# Patient Record
Sex: MALE | Race: WHITE | NOT HISPANIC OR LATINO | Employment: FULL TIME | URBAN - METROPOLITAN AREA
[De-identification: names, ages, dates, MRNs, and addresses within clinical notes are randomized per-mention and may not be internally consistent; named-entity substitution may affect disease eponyms.]

---

## 2018-02-28 ENCOUNTER — OFFICE VISIT (OUTPATIENT)
Dept: FAMILY MEDICINE | Facility: CLINIC | Age: 38
End: 2018-02-28
Payer: COMMERCIAL

## 2018-02-28 VITALS
BODY MASS INDEX: 25.76 KG/M2 | RESPIRATION RATE: 20 BRPM | DIASTOLIC BLOOD PRESSURE: 66 MMHG | HEIGHT: 69 IN | SYSTOLIC BLOOD PRESSURE: 120 MMHG | HEART RATE: 56 BPM | WEIGHT: 173.94 LBS

## 2018-02-28 DIAGNOSIS — K21.9 GASTROESOPHAGEAL REFLUX DISEASE, ESOPHAGITIS PRESENCE NOT SPECIFIED: Primary | ICD-10-CM

## 2018-02-28 PROCEDURE — 99213 OFFICE O/P EST LOW 20 MIN: CPT | Mod: S$GLB,,, | Performed by: FAMILY MEDICINE

## 2018-02-28 PROCEDURE — 99999 PR PBB SHADOW E&M-EST. PATIENT-LVL III: CPT | Mod: PBBFAC,,, | Performed by: FAMILY MEDICINE

## 2018-02-28 RX ORDER — PANTOPRAZOLE SODIUM 40 MG/1
40 TABLET, DELAYED RELEASE ORAL DAILY
Qty: 30 TABLET | Refills: 5 | Status: SHIPPED | OUTPATIENT
Start: 2018-02-28 | End: 2018-09-25

## 2018-02-28 NOTE — PROGRESS NOTES
Subjective:       Patient ID: Hernando Roman is a 37 y.o. male.    Chief Complaint: Gastroesophageal Reflux    Pt is a 37 y.o. male who presents for evaluation and management of   Encounter Diagnosis   Name Primary?    Gastroesophageal reflux disease, esophagitis presence not specified Yes   .one month   Worse at night, lying down   alcohol makes it worse   Nexium OTC helps a little but still has irritation in back of throat       Doing well on current meds. Denies any side effects. Prevention is up to date.    Review of Systems   HENT: Positive for postnasal drip. Negative for nosebleeds.         Ears feel stopped up      Respiratory: Positive for cough.    Gastrointestinal:        Per HPI       Objective:      Physical Exam   Constitutional: He is oriented to person, place, and time. He appears well-developed and well-nourished.   HENT:   Head: Normocephalic and atraumatic.   Right Ear: External ear normal.   Left Ear: External ear normal.   Nose: Nose normal.   Mouth/Throat: Oropharynx is clear and moist.   Eyes: Conjunctivae and EOM are normal. Pupils are equal, round, and reactive to light. Right eye exhibits no discharge. Left eye exhibits no discharge. No scleral icterus.   Neck: Normal range of motion. Neck supple. No JVD present. No tracheal deviation present. No thyromegaly present.   Cardiovascular: Normal rate, regular rhythm, normal heart sounds and intact distal pulses.    No murmur heard.  Pulmonary/Chest: Effort normal and breath sounds normal. No respiratory distress. He has no wheezes. He has no rales. He exhibits no tenderness.   Abdominal: Soft. Bowel sounds are normal. He exhibits no distension and no mass. There is no tenderness. There is no rebound and no guarding.   Musculoskeletal: Normal range of motion.   Lymphadenopathy:     He has no cervical adenopathy.   Neurological: He is alert and oriented to person, place, and time. He has normal reflexes. He displays normal reflexes. No cranial  nerve deficit. He exhibits normal muscle tone. Coordination normal.   Skin: Skin is warm and dry.   Psychiatric: He has a normal mood and affect. His behavior is normal. Judgment and thought content normal.       Assessment:       1. Gastroesophageal reflux disease, esophagitis presence not specified        Plan:   Hernando was seen today for gastroesophageal reflux.    Diagnoses and all orders for this visit:    Gastroesophageal reflux disease, esophagitis presence not specified  -     pantoprazole (PROTONIX) 40 MG tablet; Take 1 tablet (40 mg total) by mouth once daily.  -     ranitidine (ZANTAC) 150 MG tablet; Take 1 tablet (150 mg total) by mouth nightly.    trying above   RTC 2 weeks   IF still having these weird throat/AR symptoms will treat that, but I think related to GERD   No Follow-up on file.

## 2018-03-14 ENCOUNTER — OFFICE VISIT (OUTPATIENT)
Dept: FAMILY MEDICINE | Facility: CLINIC | Age: 38
End: 2018-03-14
Payer: COMMERCIAL

## 2018-03-14 VITALS
DIASTOLIC BLOOD PRESSURE: 78 MMHG | WEIGHT: 179 LBS | RESPIRATION RATE: 16 BRPM | SYSTOLIC BLOOD PRESSURE: 100 MMHG | HEART RATE: 78 BPM | HEIGHT: 69 IN | BODY MASS INDEX: 26.51 KG/M2

## 2018-03-14 DIAGNOSIS — K21.9 GASTROESOPHAGEAL REFLUX DISEASE, ESOPHAGITIS PRESENCE NOT SPECIFIED: Primary | ICD-10-CM

## 2018-03-14 PROCEDURE — 99999 PR PBB SHADOW E&M-EST. PATIENT-LVL III: CPT | Mod: PBBFAC,,, | Performed by: FAMILY MEDICINE

## 2018-03-14 PROCEDURE — 99213 OFFICE O/P EST LOW 20 MIN: CPT | Mod: S$GLB,,, | Performed by: FAMILY MEDICINE

## 2018-03-14 NOTE — PROGRESS NOTES
Subjective:       Patient ID: Hernando Roman is a 37 y.o. male.    Chief Complaint: Follow-up (reflux)    Pt is a 37 y.o. male who presents for evaluation and management of   Encounter Diagnosis   Name Primary?    Gastroesophageal reflux disease, esophagitis presence not specified Yes     Doing well on current meds. Denies any side effects. Prevention is up to date.    Review of Systems   Respiratory: Positive for cough. Negative for wheezing.         No h/o asthma    Gastrointestinal:        GERD improved but still some residual burning. Wakes him up at night   The cough is what wakes him up         Objective:      Physical Exam   Constitutional: He is oriented to person, place, and time. He appears well-developed and well-nourished.   HENT:   Head: Normocephalic and atraumatic.   Right Ear: External ear normal.   Left Ear: External ear normal.   Nose: Nose normal.   Mouth/Throat: Oropharynx is clear and moist.   Eyes: Conjunctivae and EOM are normal. Pupils are equal, round, and reactive to light. Right eye exhibits no discharge. Left eye exhibits no discharge. No scleral icterus.   Neck: Normal range of motion. Neck supple. No JVD present. No tracheal deviation present. No thyromegaly present.   Cardiovascular: Normal rate, regular rhythm, normal heart sounds and intact distal pulses.    No murmur heard.  Pulmonary/Chest: Effort normal and breath sounds normal. No respiratory distress. He has no wheezes. He has no rales. He exhibits no tenderness.   Abdominal: Soft. Bowel sounds are normal. He exhibits no distension and no mass. There is no tenderness. There is no rebound and no guarding.   Musculoskeletal: Normal range of motion.   Lymphadenopathy:     He has no cervical adenopathy.   Neurological: He is alert and oriented to person, place, and time. He has normal reflexes. He displays normal reflexes. No cranial nerve deficit. He exhibits normal muscle tone. Coordination normal.   Skin: Skin is warm and dry.    Psychiatric: He has a normal mood and affect. His behavior is normal. Judgment and thought content normal.       Assessment:       1. Gastroesophageal reflux disease, esophagitis presence not specified        Plan:   Hernando was seen today for follow-up.    Diagnoses and all orders for this visit:    Gastroesophageal reflux disease, esophagitis presence not specified  -     ranitidine (ZANTAC) 150 MG tablet; Take 1 tablet (150 mg total) by mouth nightly.    continue protonix   Add zantac at night. If cough not improved in two weeks then increase zantac to 300mg   RTC 1 month. Consider EGD if still not improved     No Follow-up on file.

## 2018-09-25 ENCOUNTER — OFFICE VISIT (OUTPATIENT)
Dept: FAMILY MEDICINE | Facility: CLINIC | Age: 38
End: 2018-09-25
Payer: COMMERCIAL

## 2018-09-25 VITALS
DIASTOLIC BLOOD PRESSURE: 80 MMHG | WEIGHT: 170 LBS | HEIGHT: 69 IN | BODY MASS INDEX: 25.18 KG/M2 | SYSTOLIC BLOOD PRESSURE: 100 MMHG | RESPIRATION RATE: 20 BRPM | HEART RATE: 70 BPM

## 2018-09-25 DIAGNOSIS — J02.0 STREP THROAT: Primary | ICD-10-CM

## 2018-09-25 PROCEDURE — 99999 PR PBB SHADOW E&M-EST. PATIENT-LVL III: CPT | Mod: PBBFAC,,, | Performed by: FAMILY MEDICINE

## 2018-09-25 PROCEDURE — 96372 THER/PROPH/DIAG INJ SC/IM: CPT | Mod: S$GLB,,, | Performed by: FAMILY MEDICINE

## 2018-09-25 PROCEDURE — 3008F BODY MASS INDEX DOCD: CPT | Mod: CPTII,S$GLB,, | Performed by: FAMILY MEDICINE

## 2018-09-25 PROCEDURE — 99213 OFFICE O/P EST LOW 20 MIN: CPT | Mod: 25,S$GLB,, | Performed by: FAMILY MEDICINE

## 2018-09-25 RX ORDER — AZITHROMYCIN 250 MG/1
TABLET, FILM COATED ORAL
Qty: 6 TABLET | Refills: 0 | Status: SHIPPED | OUTPATIENT
Start: 2018-09-25 | End: 2019-07-17

## 2018-09-25 RX ORDER — CETIRIZINE HYDROCHLORIDE 10 MG/1
10 TABLET ORAL DAILY
Qty: 30 TABLET | Refills: 5 | Status: SHIPPED | OUTPATIENT
Start: 2018-09-25 | End: 2019-10-17

## 2018-09-25 RX ORDER — METHYLPREDNISOLONE ACETATE 40 MG/ML
60 INJECTION, SUSPENSION INTRA-ARTICULAR; INTRALESIONAL; INTRAMUSCULAR; SOFT TISSUE
Status: COMPLETED | OUTPATIENT
Start: 2018-09-25 | End: 2018-09-25

## 2018-09-25 RX ADMIN — METHYLPREDNISOLONE ACETATE 60 MG: 40 INJECTION, SUSPENSION INTRA-ARTICULAR; INTRALESIONAL; INTRAMUSCULAR; SOFT TISSUE at 01:09

## 2018-09-25 NOTE — PROGRESS NOTES
Chief complaint: Sinus congestion    History of present illness: Pt is 38 y.o. male complaints of sinus congestion, facial pressure, sore throat, ear ache.  Patient states glands are swollen and neck.  Patient has a cough.  This started after choking on corn.  Patient denies chest pain, shortness of breath, fever.  Patient has itchy watery eyes, itchy scratchy throat.  The patient complains of decreased hearing.  Cough is nonproductive    Review of systems:  Constitutional-no weight loss, weight gain  HEENT-allergy symptoms such as itchy watery eyes, post nasal drip, itchy palate, come and go.  Respiratory-no wheezing, see history of present illness  Neurological-no weakness or numbness    Past medical history, family history, social history-same as note dated today    Medications-all reviewed and verified in nurses notes.    Physical exam: Vital signs-reviewed and verified in nurses notes  Gen.-alert, oriented, no apparent distress.  Coughing.  Head-positive facial tenderness over the frontal and maxillary sinuses  Eyes: Pupils equal round reactive to light and accommodation, extraocular muscles intact, conjunctiva clear  Ears: Tympanic membranes are clear and mobile, no fluid present.  Nose: Injected mucous membranes, erythematous, mucopurulent discharge  Throat:  Injected red streaky mucosa,  tonsils normal  Neck: Shotty, tender anterior lymphadenopathy  Heart: Regular rate and rhythm, no murmurs, rubs or gallops  Lungs:Lungs were clear to auscultation and percussion, and with normal diaphragmatic excursion. No wheezes or rales were noted.     Assessment/Plan:   Strep throat  -     methylPREDNISolone acetate injection 60 mg; Inject 1.5 mLs (60 mg total) into the muscle one time.  -     azithromycin (Z-DARON) 250 MG tablet; 2 po day 1, then 1 po q day  Dispense: 6 tablet; Refill: 0  -     cetirizine (ZYRTEC) 10 MG tablet; Take 1 tablet (10 mg total) by mouth once daily.  Dispense: 30 tablet; Refill: 5      Simply  saline nasal lavage q.2 to 3 hours p.r.n.  Avoid dust, allergens, other sinus irritants.  Handwashing technique discussed to prevent spreading germs.

## 2018-10-07 ENCOUNTER — HOSPITAL ENCOUNTER (EMERGENCY)
Facility: HOSPITAL | Age: 38
Discharge: HOME | End: 2018-10-07
Attending: SURGERY
Payer: COMMERCIAL

## 2018-10-07 VITALS
SYSTOLIC BLOOD PRESSURE: 122 MMHG | HEART RATE: 80 BPM | OXYGEN SATURATION: 98 % | RESPIRATION RATE: 16 BRPM | WEIGHT: 173.5 LBS | TEMPERATURE: 98 F | BODY MASS INDEX: 25.62 KG/M2 | DIASTOLIC BLOOD PRESSURE: 72 MMHG

## 2018-10-07 DIAGNOSIS — R06.02 SOB (SHORTNESS OF BREATH): ICD-10-CM

## 2018-10-07 LAB
ALBUMIN SERPL BCP-MCNC: 4.6 G/DL
ALLENS TEST: ABNORMAL
ALP SERPL-CCNC: 76 U/L
ALT SERPL W/O P-5'-P-CCNC: 123 U/L
ANION GAP SERPL CALC-SCNC: 10 MMOL/L
APTT BLDCRRT: 25 SEC
AST SERPL-CCNC: 42 U/L
BASOPHILS # BLD AUTO: 0.03 K/UL
BASOPHILS NFR BLD: 0.3 %
BILIRUB SERPL-MCNC: 0.7 MG/DL
BNP SERPL-MCNC: 11 PG/ML
BUN SERPL-MCNC: 19 MG/DL
CALCIUM SERPL-MCNC: 10.1 MG/DL
CHLORIDE SERPL-SCNC: 103 MMOL/L
CK MB SERPL-MCNC: 1 NG/ML
CK MB SERPL-RTO: 0.7 %
CK SERPL-CCNC: 134 U/L
CK SERPL-CCNC: 134 U/L
CO2 SERPL-SCNC: 28 MMOL/L
CREAT SERPL-MCNC: 1.1 MG/DL
D DIMER PPP IA.FEU-MCNC: 0.46 MG/L FEU
DELSYS: ABNORMAL
DIFFERENTIAL METHOD: ABNORMAL
EOSINOPHIL # BLD AUTO: 0.2 K/UL
EOSINOPHIL NFR BLD: 1.5 %
ERYTHROCYTE [DISTWIDTH] IN BLOOD BY AUTOMATED COUNT: 12.4 %
EST. GFR  (AFRICAN AMERICAN): >60 ML/MIN/1.73 M^2
EST. GFR  (NON AFRICAN AMERICAN): >60 ML/MIN/1.73 M^2
GLUCOSE SERPL-MCNC: 100 MG/DL
HCO3 UR-SCNC: 26.5 MMOL/L (ref 22–26)
HCT VFR BLD AUTO: 48.4 %
HGB BLD-MCNC: 16.6 G/DL
INR PPP: 1
LYMPHOCYTES # BLD AUTO: 2.2 K/UL
LYMPHOCYTES NFR BLD: 22.4 %
MCH RBC QN AUTO: 31.3 PG
MCHC RBC AUTO-ENTMCNC: 34.3 G/DL
MCV RBC AUTO: 91 FL
MONOCYTES # BLD AUTO: 0.8 K/UL
MONOCYTES NFR BLD: 8.4 %
NEUTROPHILS # BLD AUTO: 6.6 K/UL
NEUTROPHILS NFR BLD: 67.4 %
PCO2 BLDA: 34 MMHG (ref 35–45)
PH SMN: 7.5 [PH] (ref 7.35–7.45)
PLATELET # BLD AUTO: 429 K/UL
PMV BLD AUTO: 9.3 FL
PO2 BLDA: 122 MMHG (ref 75–100)
POC BE: 3.7 MMOL/L (ref -2–2)
POC COHB: 0.2 % (ref 0–3)
POC METHB: 0.7 % (ref 0–1.5)
POC O2HB ARTERIAL: 96.4 % (ref 94–100)
POC SATURATED O2: 97.2 % (ref 90–100)
POC TCO2: 27.5 MMOL/L
POC THB: 17.2 G/DL (ref 12–18)
POTASSIUM SERPL-SCNC: 4.6 MMOL/L
PROT SERPL-MCNC: 8.2 G/DL
PROTHROMBIN TIME: 10.3 SEC
RBC # BLD AUTO: 5.3 M/UL
SITE: ABNORMAL
SODIUM SERPL-SCNC: 141 MMOL/L
TROPONIN I SERPL DL<=0.01 NG/ML-MCNC: <0.006 NG/ML
WBC # BLD AUTO: 9.82 K/UL

## 2018-10-07 PROCEDURE — 82553 CREATINE MB FRACTION: CPT

## 2018-10-07 PROCEDURE — 25000242 PHARM REV CODE 250 ALT 637 W/ HCPCS: Performed by: SURGERY

## 2018-10-07 PROCEDURE — 94640 AIRWAY INHALATION TREATMENT: CPT

## 2018-10-07 PROCEDURE — 96372 THER/PROPH/DIAG INJ SC/IM: CPT

## 2018-10-07 PROCEDURE — 83880 ASSAY OF NATRIURETIC PEPTIDE: CPT

## 2018-10-07 PROCEDURE — 93010 ELECTROCARDIOGRAM REPORT: CPT | Mod: ,,, | Performed by: INTERNAL MEDICINE

## 2018-10-07 PROCEDURE — 36415 COLL VENOUS BLD VENIPUNCTURE: CPT

## 2018-10-07 PROCEDURE — 85379 FIBRIN DEGRADATION QUANT: CPT

## 2018-10-07 PROCEDURE — 82803 BLOOD GASES ANY COMBINATION: CPT | Performed by: SURGERY

## 2018-10-07 PROCEDURE — 63600175 PHARM REV CODE 636 W HCPCS: Performed by: SURGERY

## 2018-10-07 PROCEDURE — 80053 COMPREHEN METABOLIC PANEL: CPT

## 2018-10-07 PROCEDURE — 84484 ASSAY OF TROPONIN QUANT: CPT

## 2018-10-07 PROCEDURE — 85610 PROTHROMBIN TIME: CPT

## 2018-10-07 PROCEDURE — 36600 WITHDRAWAL OF ARTERIAL BLOOD: CPT

## 2018-10-07 PROCEDURE — 93005 ELECTROCARDIOGRAM TRACING: CPT

## 2018-10-07 PROCEDURE — 85730 THROMBOPLASTIN TIME PARTIAL: CPT

## 2018-10-07 PROCEDURE — 85025 COMPLETE CBC W/AUTO DIFF WBC: CPT

## 2018-10-07 PROCEDURE — 82550 ASSAY OF CK (CPK): CPT

## 2018-10-07 PROCEDURE — 99284 EMERGENCY DEPT VISIT MOD MDM: CPT | Mod: 25

## 2018-10-07 RX ORDER — METHYLPREDNISOLONE SOD SUCC 125 MG
125 VIAL (EA) INJECTION
Status: COMPLETED | OUTPATIENT
Start: 2018-10-07 | End: 2018-10-07

## 2018-10-07 RX ORDER — METHYLPREDNISOLONE 4 MG/1
TABLET ORAL
Qty: 1 PACKAGE | Refills: 0 | Status: SHIPPED | OUTPATIENT
Start: 2018-10-07 | End: 2019-07-17

## 2018-10-07 RX ORDER — IPRATROPIUM BROMIDE AND ALBUTEROL SULFATE 2.5; .5 MG/3ML; MG/3ML
3 SOLUTION RESPIRATORY (INHALATION)
Status: COMPLETED | OUTPATIENT
Start: 2018-10-07 | End: 2018-10-07

## 2018-10-07 RX ORDER — ALBUTEROL SULFATE 90 UG/1
1-2 AEROSOL, METERED RESPIRATORY (INHALATION) EVERY 6 HOURS PRN
Qty: 1 INHALER | Refills: 0 | Status: SHIPPED | OUTPATIENT
Start: 2018-10-07 | End: 2019-10-17

## 2018-10-07 RX ADMIN — METHYLPREDNISOLONE SODIUM SUCCINATE 125 MG: 125 INJECTION, POWDER, FOR SOLUTION INTRAMUSCULAR; INTRAVENOUS at 05:10

## 2018-10-07 RX ADMIN — IPRATROPIUM BROMIDE AND ALBUTEROL SULFATE 3 ML: .5; 3 SOLUTION RESPIRATORY (INHALATION) at 05:10

## 2018-10-07 NOTE — ED TRIAGE NOTES
"Pt reports thinking he has carbon monoxide poisoning. States "air leak in car" for unknown amount of time. States trouble taking deep breath and coughing x's 3 weeks. Seen by PCP twice.  "

## 2018-10-08 ENCOUNTER — PES CALL (OUTPATIENT)
Dept: ADMINISTRATIVE | Facility: CLINIC | Age: 38
End: 2018-10-08

## 2018-10-08 NOTE — ED PROVIDER NOTES
[facility]                                                 Chief Complaint  38 y.o. male with Shortness of Breath    History of Present Illness  Hernando Roman presents to the emergency room with shortness of breath today  Patient states he recently had automobile work done, old tubing was kept after  Breathed in fumes in the car and thinks he has carbon monoxide exposure PTA  Has clear lungs and normal cardiac evaluation today in the ER, 98% oxygen    The history is provided by the patient   device was not used during this ER visit  Medical history:  Allergies, facet arthropathy, food poisoning  Surgeries:  Appendectomy, cervical nerve block, EGD, wisdom tooth extracted  No Known Allergies     Review of Systems and Physical Exam      Review of Systems  -- Constitution - no fever, denies fatigue, no weakness, no chills  -- Eyes - no tearing or redness, no visual disturbance  -- Ear, Nose - no tinnitus or earache, no nasal congestion or discharge  -- Mouth,Throat - no sore throat, no toothache, normal voice, normal swallowing  -- Respiratory - shortness of breath, no CONNOR no cough or congestion  -- Cardiovascular - denies chest pain, no palpitations, denies claudication  -- Gastrointestinal - denies abdominal pain, nausea, vomiting, or diarrhea  -- Genitourinary - no dysuria, no hematuria, no flank pain, no bladder pain  -- Musculoskeletal - denies back pain, negative for myalgias and arthralgias   -- Neurological - no headache, denies weakness or seizure; no LOC  -- Skin - denies pallor, rash, or changes in skin. no hives or welts noted    Vital Signs  His oral temperature is 97.7 °F (36.5 °C).   His blood pressure is 122/72 and his pulse is 80.   His respiration is 16 and oxygen saturation is 98%.     Physical Exam  -- Nursing note and vitals reviewed  -- Constitutional: Appears well-developed and well-nourished  -- Head: Atraumatic. Normocephalic. No obvious abnormality  --  Eyes: Pupils are equal and reactive to light. Normal conjunctiva and lids  -- Cardiac: Normal rate, regular rhythm and normal heart sounds  -- Pulmonary: Normal respiratory effort, breath sounds clear to auscultation  -- Abdominal: Soft, no tenderness. Normal bowel sounds. Normal liver edge  -- Musculoskeletal: Normal range of motion, no effusions. Joints stable   -- Neurological: No focal deficits. Showed good interaction with staff  -- Skin: Warm and dry. No evidence of rash or cellulitis    Emergency Room Course      Lab Results     K 4.6      CO2 28   BUN 19   CREATININE 1.1      ALKPHOS 76   AST 42 (H)    (H)   BILITOT 0.7   ALBUMIN 4.6   PROT 8.2   WBC 9.82   HGB 16.6   HCT 48.4    (H)         CPKMB 1.0   TROPONINI <0.006   INR 1.0   BNP 11   DDIMER 0.46     Arterial blood gas   PH  7.50*   PCO2  34*   PO2  122*   HCO3  26.50*   POCSATURATED  97.2   BE  3.70*      EKG  -- The EKG findings today were without concerning findings from baseline    Radiology  -- Chest x-ray showed no infiltrate and showed no acute pathology    Medications Given  methylPREDNISolone sodium succinate injection 125 mg (125 mg Intramuscular Given 10/7/18 1730)   albuterol-ipratropium 2.5 mg-0.5 mg/3 mL nebulizer solution 3 mL (3 mLs Nebulization Given 10/7/18 1725)     Diagnosis  -- The encounter diagnosis was SOB (shortness of breath).    Disposition and Plan  -- Disposition: home  -- Condition: stable  -- Follow-up: Patient to follow up with Louisa Briseno NP in 1-2 days.  -- I advised the patient that we have found no life threatening condition today  -- At this time, I believe the patient is clinically stable for discharge.   -- The patient acknowledges that close follow up with a MD is required   -- Patient agrees to comply with all instruction and direction given in the ER    This note is dictated on Dragon Natural Speaking word recognition program.  There are word recognition  mistakes that are occasionally missed on review.          Leroy Landrum MD  10/07/18 1927

## 2019-07-17 ENCOUNTER — OFFICE VISIT (OUTPATIENT)
Dept: FAMILY MEDICINE | Facility: CLINIC | Age: 39
End: 2019-07-17
Payer: COMMERCIAL

## 2019-07-17 VITALS
BODY MASS INDEX: 24.56 KG/M2 | RESPIRATION RATE: 16 BRPM | WEIGHT: 165.81 LBS | DIASTOLIC BLOOD PRESSURE: 80 MMHG | SYSTOLIC BLOOD PRESSURE: 122 MMHG | HEIGHT: 69 IN | HEART RATE: 72 BPM

## 2019-07-17 DIAGNOSIS — R10.12 LUQ PAIN: Primary | ICD-10-CM

## 2019-07-17 PROCEDURE — 3008F PR BODY MASS INDEX (BMI) DOCUMENTED: ICD-10-PCS | Mod: CPTII,S$GLB,, | Performed by: FAMILY MEDICINE

## 2019-07-17 PROCEDURE — 99213 PR OFFICE/OUTPT VISIT, EST, LEVL III, 20-29 MIN: ICD-10-PCS | Mod: S$GLB,,, | Performed by: FAMILY MEDICINE

## 2019-07-17 PROCEDURE — 99213 OFFICE O/P EST LOW 20 MIN: CPT | Mod: S$GLB,,, | Performed by: FAMILY MEDICINE

## 2019-07-17 PROCEDURE — 99999 PR PBB SHADOW E&M-EST. PATIENT-LVL III: ICD-10-PCS | Mod: PBBFAC,,, | Performed by: FAMILY MEDICINE

## 2019-07-17 PROCEDURE — 3008F BODY MASS INDEX DOCD: CPT | Mod: CPTII,S$GLB,, | Performed by: FAMILY MEDICINE

## 2019-07-17 PROCEDURE — 99999 PR PBB SHADOW E&M-EST. PATIENT-LVL III: CPT | Mod: PBBFAC,,, | Performed by: FAMILY MEDICINE

## 2019-07-17 RX ORDER — SUCRALFATE 1 G/1
1 TABLET ORAL
Qty: 120 TABLET | Refills: 1 | Status: SHIPPED | OUTPATIENT
Start: 2019-07-17 | End: 2019-09-20 | Stop reason: SDUPTHER

## 2019-07-17 RX ORDER — PANTOPRAZOLE SODIUM 40 MG/1
40 TABLET, DELAYED RELEASE ORAL DAILY
Qty: 30 TABLET | Refills: 5 | Status: SHIPPED | OUTPATIENT
Start: 2019-07-17 | End: 2019-08-16

## 2019-07-17 NOTE — PROGRESS NOTES
Subjective:       Patient ID: Hernando Roman is a 39 y.o. male.    Chief Complaint: Annual Exam and Left abd pain    Pt is a 39 y.o. male who presents for evaluation and management of   Encounter Diagnosis   Name Primary?    LUQ pain Yes   .2 months   Gradually worsening   Doesn't seem to be related to any particular type of food   Worse when working out   Tried Nexium OTC daily not helping     Doing well on current meds. Denies any side effects. Prevention is up to date.    Review of Systems   Constitutional: Positive for appetite change (no appetite lately) and chills. Negative for fever.   Gastrointestinal: Positive for abdominal pain and nausea. Negative for blood in stool, constipation, diarrhea and vomiting.   Endocrine: Positive for cold intolerance.        Objective:      Physical Exam   Constitutional: He is oriented to person, place, and time. He appears well-developed and well-nourished.   HENT:   Head: Normocephalic and atraumatic.   Right Ear: External ear normal.   Left Ear: External ear normal.   Nose: Nose normal.   Mouth/Throat: Oropharynx is clear and moist.   Eyes: Pupils are equal, round, and reactive to light. Conjunctivae and EOM are normal. Right eye exhibits no discharge. Left eye exhibits no discharge. No scleral icterus.   Neck: Normal range of motion. Neck supple. No JVD present. No tracheal deviation present. No thyromegaly present.   Cardiovascular: Normal rate, regular rhythm, normal heart sounds and intact distal pulses.   No murmur heard.  Pulmonary/Chest: Effort normal and breath sounds normal. No respiratory distress. He has no wheezes. He has no rales. He exhibits no tenderness.   Abdominal: Soft. Bowel sounds are normal. He exhibits no distension and no mass. There is tenderness (LUQ TTP ). There is no rebound and no guarding.   Musculoskeletal: Normal range of motion.   Lymphadenopathy:     He has no cervical adenopathy.   Neurological: He is alert and oriented to person, place,  and time. He has normal reflexes. He displays normal reflexes. No cranial nerve deficit. He exhibits normal muscle tone. Coordination normal.   Skin: Skin is warm and dry.   Psychiatric: He has a normal mood and affect. His behavior is normal. Judgment and thought content normal.        Assessment:       1. LUQ pain        Plan:   Hernando was seen today for annual exam and left abd pain.    Diagnoses and all orders for this visit:    LUQ pain  -     pantoprazole (PROTONIX) 40 MG tablet; Take 1 tablet (40 mg total) by mouth once daily.  -     sucralfate (CARAFATE) 1 gram tablet; Take 1 tablet (1 g total) by mouth 4 (four) times daily before meals and nightly.      Problem List Items Addressed This Visit     None      Visit Diagnoses     LUQ pain    -  Primary    Relevant Medications    pantoprazole (PROTONIX) 40 MG tablet    sucralfate (CARAFATE) 1 gram tablet      trial of above   RTC 2 weeks   EGD if not improving     No follow-ups on file.

## 2019-09-20 DIAGNOSIS — R10.12 LUQ PAIN: ICD-10-CM

## 2019-09-22 RX ORDER — SUCRALFATE 1 G/1
1 TABLET ORAL
Qty: 120 TABLET | Refills: 1 | Status: SHIPPED | OUTPATIENT
Start: 2019-09-22 | End: 2019-09-25

## 2019-09-25 ENCOUNTER — TELEPHONE (OUTPATIENT)
Dept: FAMILY MEDICINE | Facility: CLINIC | Age: 39
End: 2019-09-25

## 2019-09-25 ENCOUNTER — HOSPITAL ENCOUNTER (EMERGENCY)
Facility: HOSPITAL | Age: 39
Discharge: HOME | End: 2019-09-25
Attending: SURGERY
Payer: COMMERCIAL

## 2019-09-25 VITALS
SYSTOLIC BLOOD PRESSURE: 109 MMHG | HEART RATE: 64 BPM | OXYGEN SATURATION: 95 % | TEMPERATURE: 96 F | WEIGHT: 167.56 LBS | RESPIRATION RATE: 20 BRPM | DIASTOLIC BLOOD PRESSURE: 73 MMHG | BODY MASS INDEX: 24.74 KG/M2

## 2019-09-25 DIAGNOSIS — R00.2 PALPITATIONS: Primary | ICD-10-CM

## 2019-09-25 DIAGNOSIS — R07.9 CHEST PAIN, UNSPECIFIED TYPE: ICD-10-CM

## 2019-09-25 DIAGNOSIS — R06.02 SOB (SHORTNESS OF BREATH): ICD-10-CM

## 2019-09-25 DIAGNOSIS — R11.0 NAUSEA: ICD-10-CM

## 2019-09-25 LAB
ALBUMIN SERPL BCP-MCNC: 4.9 G/DL (ref 3.5–5.2)
ALP SERPL-CCNC: 79 U/L (ref 55–135)
ALT SERPL W/O P-5'-P-CCNC: 69 U/L (ref 10–44)
AMPHET+METHAMPHET UR QL: NEGATIVE
ANION GAP SERPL CALC-SCNC: 11 MMOL/L (ref 8–16)
AST SERPL-CCNC: 32 U/L (ref 10–40)
BARBITURATES UR QL SCN>200 NG/ML: NEGATIVE
BASOPHILS # BLD AUTO: 0.03 K/UL (ref 0–0.2)
BASOPHILS NFR BLD: 0.5 % (ref 0–1.9)
BENZODIAZ UR QL SCN>200 NG/ML: NEGATIVE
BILIRUB SERPL-MCNC: 1.1 MG/DL (ref 0.1–1)
BILIRUB UR QL STRIP: NEGATIVE
BNP SERPL-MCNC: <10 PG/ML (ref 0–99)
BUN SERPL-MCNC: 17 MG/DL (ref 6–20)
BZE UR QL SCN: NEGATIVE
CALCIUM SERPL-MCNC: 10.2 MG/DL (ref 8.7–10.5)
CANNABINOIDS UR QL SCN: NEGATIVE
CHLORIDE SERPL-SCNC: 101 MMOL/L (ref 95–110)
CK MB SERPL-MCNC: 0.6 NG/ML (ref 0.1–6.5)
CK MB SERPL-MCNC: 0.6 NG/ML (ref 0.1–6.5)
CK MB SERPL-RTO: 0.6 % (ref 0–5)
CK MB SERPL-RTO: 0.7 % (ref 0–5)
CK SERPL-CCNC: 81 U/L (ref 20–200)
CK SERPL-CCNC: 81 U/L (ref 20–200)
CK SERPL-CCNC: 95 U/L (ref 20–200)
CK SERPL-CCNC: 95 U/L (ref 20–200)
CLARITY UR: CLEAR
CO2 SERPL-SCNC: 26 MMOL/L (ref 23–29)
COLOR UR: YELLOW
CREAT SERPL-MCNC: 1.3 MG/DL (ref 0.5–1.4)
CREAT UR-MCNC: 40.6 MG/DL (ref 23–375)
D DIMER PPP IA.FEU-MCNC: <0.19 MG/L FEU
DIFFERENTIAL METHOD: ABNORMAL
EOSINOPHIL # BLD AUTO: 0.1 K/UL (ref 0–0.5)
EOSINOPHIL NFR BLD: 2.1 % (ref 0–8)
ERYTHROCYTE [DISTWIDTH] IN BLOOD BY AUTOMATED COUNT: 11.4 % (ref 11.5–14.5)
EST. GFR  (AFRICAN AMERICAN): >60 ML/MIN/1.73 M^2
EST. GFR  (NON AFRICAN AMERICAN): >60 ML/MIN/1.73 M^2
GLUCOSE SERPL-MCNC: 92 MG/DL (ref 70–110)
GLUCOSE UR QL STRIP: NEGATIVE
HCT VFR BLD AUTO: 48.9 % (ref 40–54)
HGB BLD-MCNC: 16.9 G/DL (ref 14–18)
HGB UR QL STRIP: ABNORMAL
IMM GRANULOCYTES # BLD AUTO: 0.01 K/UL (ref 0–0.04)
IMM GRANULOCYTES NFR BLD AUTO: 0.2 % (ref 0–0.5)
KETONES UR QL STRIP: NEGATIVE
LEUKOCYTE ESTERASE UR QL STRIP: NEGATIVE
LYMPHOCYTES # BLD AUTO: 1.9 K/UL (ref 1–4.8)
LYMPHOCYTES NFR BLD: 34.4 % (ref 18–48)
MCH RBC QN AUTO: 30.9 PG (ref 27–31)
MCHC RBC AUTO-ENTMCNC: 34.6 G/DL (ref 32–36)
MCV RBC AUTO: 89 FL (ref 82–98)
METHADONE UR QL SCN>300 NG/ML: NEGATIVE
MONOCYTES # BLD AUTO: 0.4 K/UL (ref 0.3–1)
MONOCYTES NFR BLD: 7.8 % (ref 4–15)
NEUTROPHILS # BLD AUTO: 3.1 K/UL (ref 1.8–7.7)
NEUTROPHILS NFR BLD: 55 % (ref 38–73)
NITRITE UR QL STRIP: NEGATIVE
NRBC BLD-RTO: 0 /100 WBC
OPIATES UR QL SCN: NEGATIVE
PCP UR QL SCN>25 NG/ML: NEGATIVE
PH UR STRIP: 7 [PH] (ref 5–8)
PLATELET # BLD AUTO: 308 K/UL (ref 150–350)
PMV BLD AUTO: 9.3 FL (ref 9.2–12.9)
POCT GLUCOSE: 80 MG/DL (ref 70–110)
POTASSIUM SERPL-SCNC: 3.9 MMOL/L (ref 3.5–5.1)
PROT SERPL-MCNC: 8.2 G/DL (ref 6–8.4)
PROT UR QL STRIP: NEGATIVE
RBC # BLD AUTO: 5.47 M/UL (ref 4.6–6.2)
SODIUM SERPL-SCNC: 138 MMOL/L (ref 136–145)
SP GR UR STRIP: 1.01 (ref 1–1.03)
TOXICOLOGY INFORMATION: NORMAL
TROPONIN I SERPL DL<=0.01 NG/ML-MCNC: <0.006 NG/ML (ref 0–0.03)
TROPONIN I SERPL DL<=0.01 NG/ML-MCNC: <0.006 NG/ML (ref 0–0.03)
TSH SERPL DL<=0.005 MIU/L-ACNC: 0.76 UIU/ML (ref 0.4–4)
URN SPEC COLLECT METH UR: ABNORMAL
UROBILINOGEN UR STRIP-ACNC: NEGATIVE EU/DL
WBC # BLD AUTO: 5.64 K/UL (ref 3.9–12.7)

## 2019-09-25 PROCEDURE — 84443 ASSAY THYROID STIM HORMONE: CPT

## 2019-09-25 PROCEDURE — 36415 COLL VENOUS BLD VENIPUNCTURE: CPT

## 2019-09-25 PROCEDURE — 96375 TX/PRO/DX INJ NEW DRUG ADDON: CPT

## 2019-09-25 PROCEDURE — 84484 ASSAY OF TROPONIN QUANT: CPT

## 2019-09-25 PROCEDURE — 99285 EMERGENCY DEPT VISIT HI MDM: CPT | Mod: 25

## 2019-09-25 PROCEDURE — 83880 ASSAY OF NATRIURETIC PEPTIDE: CPT

## 2019-09-25 PROCEDURE — 82962 GLUCOSE BLOOD TEST: CPT

## 2019-09-25 PROCEDURE — 81003 URINALYSIS AUTO W/O SCOPE: CPT

## 2019-09-25 PROCEDURE — 80053 COMPREHEN METABOLIC PANEL: CPT

## 2019-09-25 PROCEDURE — 80307 DRUG TEST PRSMV CHEM ANLYZR: CPT

## 2019-09-25 PROCEDURE — 85379 FIBRIN DEGRADATION QUANT: CPT

## 2019-09-25 PROCEDURE — 96374 THER/PROPH/DIAG INJ IV PUSH: CPT

## 2019-09-25 PROCEDURE — 93010 ELECTROCARDIOGRAM REPORT: CPT | Mod: ,,, | Performed by: INTERNAL MEDICINE

## 2019-09-25 PROCEDURE — 93005 ELECTROCARDIOGRAM TRACING: CPT

## 2019-09-25 PROCEDURE — 25000003 PHARM REV CODE 250: Performed by: NURSE PRACTITIONER

## 2019-09-25 PROCEDURE — 82550 ASSAY OF CK (CPK): CPT | Mod: 91

## 2019-09-25 PROCEDURE — 82553 CREATINE MB FRACTION: CPT | Mod: 91

## 2019-09-25 PROCEDURE — 63600175 PHARM REV CODE 636 W HCPCS: Performed by: NURSE PRACTITIONER

## 2019-09-25 PROCEDURE — 93010 EKG 12-LEAD: ICD-10-PCS | Mod: ,,, | Performed by: INTERNAL MEDICINE

## 2019-09-25 PROCEDURE — 96361 HYDRATE IV INFUSION ADD-ON: CPT

## 2019-09-25 PROCEDURE — 85025 COMPLETE CBC W/AUTO DIFF WBC: CPT

## 2019-09-25 RX ORDER — METHYLPREDNISOLONE SOD SUCC 125 MG
125 VIAL (EA) INJECTION
Status: DISCONTINUED | OUTPATIENT
Start: 2019-09-25 | End: 2019-09-25

## 2019-09-25 RX ORDER — SUCRALFATE 1 G/10ML
1 SUSPENSION ORAL
Qty: 414 ML | Refills: 0 | Status: ON HOLD | OUTPATIENT
Start: 2019-09-25 | End: 2019-10-22

## 2019-09-25 RX ORDER — ONDANSETRON 4 MG/1
4 TABLET, ORALLY DISINTEGRATING ORAL EVERY 8 HOURS PRN
Qty: 9 TABLET | Refills: 0 | Status: SHIPPED | OUTPATIENT
Start: 2019-09-25 | End: 2019-09-28

## 2019-09-25 RX ORDER — ASPIRIN 325 MG
325 TABLET ORAL
Status: COMPLETED | OUTPATIENT
Start: 2019-09-25 | End: 2019-09-25

## 2019-09-25 RX ORDER — METHYLPREDNISOLONE SOD SUCC 125 MG
80 VIAL (EA) INJECTION
Status: COMPLETED | OUTPATIENT
Start: 2019-09-25 | End: 2019-09-25

## 2019-09-25 RX ORDER — ONDANSETRON 2 MG/ML
4 INJECTION INTRAMUSCULAR; INTRAVENOUS
Status: COMPLETED | OUTPATIENT
Start: 2019-09-25 | End: 2019-09-25

## 2019-09-25 RX ADMIN — METHYLPREDNISOLONE SODIUM SUCCINATE 80 MG: 125 INJECTION, POWDER, FOR SOLUTION INTRAMUSCULAR; INTRAVENOUS at 02:09

## 2019-09-25 RX ADMIN — ASPIRIN 325 MG ORAL TABLET 325 MG: 325 PILL ORAL at 01:09

## 2019-09-25 RX ADMIN — SODIUM CHLORIDE 1000 ML: 0.9 INJECTION, SOLUTION INTRAVENOUS at 01:09

## 2019-09-25 RX ADMIN — ONDANSETRON 4 MG: 2 INJECTION INTRAMUSCULAR; INTRAVENOUS at 02:09

## 2019-09-25 NOTE — ED PROVIDER NOTES
Encounter Date: 2019       History     Chief Complaint   Patient presents with    Shortness of Breath     The history is provided by the patient.   Palpitations    This is a new problem. Illness onset: 1 week. The problem has been unchanged. Associated with: unknown. Associated symptoms include diaphoresis (earlier today), chest pain (mild, 2/10, worse with deep breathing) and irregular heartbeat. Pertinent negatives include no fever, no malaise/fatigue, no numbness, no syncope, no abdominal pain, no nausea, no vomiting, no headaches, no back pain, no dizziness, no weakness, no cough and no shortness of breath. He has tried nothing for the symptoms. Risk factors include being male.     Review of patient's allergies indicates:  No Known Allergies  Past Medical History:   Diagnosis Date    Allergy     Facet arthropathy, cervical     Food poisoning due to bacteria      Past Surgical History:   Procedure Laterality Date    ESOPHAGOGASTRODUODENOSCOPY      WISDOM TOOTH EXTRACTION Bilateral 2007    x4 teeth     Family History   Problem Relation Age of Onset    Asthma Mother     Hypertension Father     Cancer Father 55        prostate     Social History     Tobacco Use    Smoking status: Former Smoker     Packs/day: 1.00     Years: 18.00     Pack years: 18.00     Start date: 1996     Last attempt to quit: 2013     Years since quittin.1    Smokeless tobacco: Former User     Quit date: 1998    Tobacco comment: Chewed tobacco about 2 or 3 years   Substance Use Topics    Alcohol use: Yes     Comment: Once a month at the most-3 or 4 drinks    Drug use: No     Review of Systems   Constitutional: Positive for diaphoresis (earlier today). Negative for fever and malaise/fatigue.   HENT: Negative for congestion, ear pain, rhinorrhea, sore throat and trouble swallowing.    Eyes: Negative for pain, discharge, redness and visual disturbance.   Respiratory: Negative for cough, shortness of breath  and wheezing.    Cardiovascular: Positive for chest pain (mild, 2/10, worse with deep breathing) and palpitations. Negative for leg swelling and syncope.   Gastrointestinal: Negative for abdominal pain, constipation, diarrhea, nausea and vomiting.   Genitourinary: Negative for difficulty urinating, dysuria, flank pain, frequency and urgency.   Musculoskeletal: Negative for arthralgias, back pain, myalgias and neck pain.   Skin: Negative for rash and wound.   Neurological: Negative for dizziness, seizures, weakness, numbness and headaches.   Psychiatric/Behavioral: Negative.        Physical Exam     Initial Vitals [09/25/19 1218]   BP Pulse Resp Temp SpO2   (!) 134/98 64 18 96.4 °F (35.8 °C) 99 %      MAP       --         Physical Exam    Nursing note and vitals reviewed.  Constitutional: No distress.   HENT:   Head: Normocephalic and atraumatic.   Right Ear: Tympanic membrane, external ear and ear canal normal.   Left Ear: Tympanic membrane, external ear and ear canal normal.   Nose: Nose normal.   Mouth/Throat: Oropharynx is clear and moist.   Eyes: Conjunctivae, EOM and lids are normal. Pupils are equal, round, and reactive to light.   Neck: Neck supple.   Cardiovascular: Normal rate, regular rhythm, normal heart sounds and intact distal pulses.   Pulmonary/Chest: Breath sounds normal. No respiratory distress.   Abdominal: Soft. Bowel sounds are normal. There is no tenderness.   Musculoskeletal: Normal range of motion.   Neurological: He is alert and oriented to person, place, and time. He has normal strength.   Skin: Skin is warm and dry. Capillary refill takes less than 2 seconds. No rash noted.   Psychiatric: His speech is normal and behavior is normal. His mood appears anxious.         ED Course   Procedures  Labs Reviewed   CBC W/ AUTO DIFFERENTIAL - Abnormal; Notable for the following components:       Result Value    RDW 11.4 (*)     All other components within normal limits   COMPREHENSIVE METABOLIC PANEL  - Abnormal; Notable for the following components:    Total Bilirubin 1.1 (*)     ALT 69 (*)     All other components within normal limits   URINALYSIS, REFLEX TO URINE CULTURE - Abnormal; Notable for the following components:    Occult Blood UA Trace (*)     All other components within normal limits    Narrative:     Preferred Collection Type->Urine, Clean Catch   CK-MB   CK   TROPONIN I   B-TYPE NATRIURETIC PEPTIDE   D DIMER, QUANTITATIVE   TSH   DRUG SCREEN PANEL, URINE EMERGENCY    Narrative:     Preferred Collection Type->Urine, Clean Catch   CK-MB   CK   TROPONIN I   POCT GLUCOSE   POCT GLUCOSE MONITORING CONTINUOUS     EKG Readings: (Independently Interpreted)   EKG read with MD at the time it was performed. EKG without concerning findings.      ECG Results          EKG 12-lead (Final result)  Result time 09/25/19 15:51:53    Final result by Interface, Lab In [facility] (09/25/19 15:51:53)                 Narrative:    Test Reason : R06.02    Vent. Rate : 069 BPM     Atrial Rate : 069 BPM     P-R Int : 124 ms          QRS Dur : 078 ms      QT Int : 380 ms       P-R-T Axes : 049 041 040 degrees     QTc Int : 407 ms    Normal sinus rhythm with sinus arrhythmia  Normal ECG  When compared with ECG of 07-OCT-2018 16:12,  No significant change was found  Confirmed by Radhames Armstrong MD ([id number]) on 9/25/2019 3:51:46 PM    Referred By: [email or web address]   SELF           Confirmed By:Radhames Armstrong MD                            Imaging Results          X-Ray Chest PA And Lateral (Final result)  Result time 09/25/19 12:52:26    Final result by Karrie Lewis MD (09/25/19 12:52:26)                 Impression:      No acute abnormality.      Electronically signed by: Karrie Lewis MD  Date:    09/25/2019  Time:    12:52             Narrative:    EXAMINATION:  XR CHEST PA AND LATERAL    CLINICAL HISTORY:  Palpitations    TECHNIQUE:  PA and lateral views of the chest were performed.    COMPARISON:  10/07/2018    FINDINGS:  The lungs  are clear, with normal appearance of pulmonary vasculature and no pleural effusion or pneumothorax.    The cardiac silhouette is normal in size. The hilar and mediastinal contours are unremarkable.    Bones are intact.                                           Medications   aspirin tablet 325 mg (325 mg Oral Given 9/25/19 1305)   sodium chloride 0.9% bolus 1,000 mL (0 mLs Intravenous Stopped 9/25/19 1418)   ondansetron injection 4 mg (4 mg Intravenous Given 9/25/19 1454)   methylPREDNISolone sodium succinate injection 80 mg (80 mg Intravenous Given 9/25/19 1454)                ED Course as of Sep 25 1700   Wed Sep 25, 2019   1445 Patient reports significant improvement in his symptoms.  Reports that he no longer has chest pain. He is concerned because his apple watch is showing a jump in his heart rate from the 50s to 90s intermittently.  Pulse here has been 50s to 70s, normal sinus rhythm.    [EW]   1655 Patient reports continued improvement in his symptoms.  Reports that he is also currently being treated for peptic ulcer disease with Carafate pills.  He is requesting a prescription for the liquid medication.  He was educated to follow up with his primary care doctor and Cardiology for Holter monitoring.  Patient and significant other voiced understanding.  Strict return precautions were discussed.    [EW]   1657 Patient reports that the chest pain noted with deep breathing has completely resolved.    [EW]      ED Course User Index  [EW] Dalila Gilman NP     Clinical Impression:       ICD-10-CM ICD-9-CM   1. Palpitations R00.2 785.1   2. SOB (shortness of breath) R06.02 786.05   3. Nausea R11.0 787.02   4. Chest pain, unspecified type R07.9 786.50     New Prescriptions    ONDANSETRON (ZOFRAN-ODT) 4 MG TBDL    Take 1 tablet (4 mg total) by mouth every 8 (eight) hours as needed (nausea).    SUCRALFATE (CARAFATE) 100 MG/ML SUSPENSION    Take 10 mLs (1 g total) by mouth 4 (four) times daily with meals and nightly.  *1 hour prior to meals       Disposition:   Disposition: Discharged  Condition: Stable    The patient acknowledges that close follow up with medical provider is required. Instructed to follow up with PCP and cardiology within 2 days. Patient was given specific return precautions. The patient agrees to comply with all instruction and directions given in the ER.                       Dalila Gilman NP  09/25/19 2770

## 2019-09-25 NOTE — TELEPHONE ENCOUNTER
Pt calling for an appt today with Dr Quintero--pt with c/o chest pain/pressure, slight trouble breathing. Increased pulse. Advised pt to report to ER for eval due to s/s, voiced understanding.

## 2019-09-25 NOTE — ED TRIAGE NOTES
Arrives per self transport from home. Presents with complaints of SOB/chest discomfort over the past week. Reports that symptoms started 1 month ago. Worked up by Dr Quintero and treated for GI symptoms but heart rate and SOB have persisted. Currently AAOx4. Appears in no apparent distress. Vitals stable. Patient to be roomed in ED  for evaluation.

## 2019-09-25 NOTE — DISCHARGE INSTRUCTIONS
**Follow up with PCP and Cardiology in 24-48 hours.  Discuss need for EGD with PCP.  Discuss need for Holter monitor with Cardiology.  Return to ER with worsening of symptoms.     **Drink plenty fluids. Get plenty rest. Wash hands frequently.  Avoid caffeine.    **Our goal in the emergency department is to always give you outstanding care and exceptional service. You may receive a survey by mail or e-mail in the next week regarding your experience in our ED. We would greatly appreciate your completing and returning the survey. Your feedback provides us with a way to recognize our staff who give very good care and it helps us learn how to improve when your experience was below our aspiration of excellence.

## 2019-09-26 ENCOUNTER — OFFICE VISIT (OUTPATIENT)
Dept: INTERNAL MEDICINE | Facility: CLINIC | Age: 39
End: 2019-09-26
Payer: COMMERCIAL

## 2019-09-26 ENCOUNTER — TELEPHONE (OUTPATIENT)
Dept: FAMILY MEDICINE | Facility: CLINIC | Age: 39
End: 2019-09-26

## 2019-09-26 VITALS
HEART RATE: 76 BPM | BODY MASS INDEX: 24.73 KG/M2 | SYSTOLIC BLOOD PRESSURE: 122 MMHG | OXYGEN SATURATION: 99 % | WEIGHT: 167 LBS | HEIGHT: 69 IN | RESPIRATION RATE: 18 BRPM | DIASTOLIC BLOOD PRESSURE: 86 MMHG

## 2019-09-26 DIAGNOSIS — R10.11 RIGHT UPPER QUADRANT PAIN: Primary | ICD-10-CM

## 2019-09-26 DIAGNOSIS — R11.0 NAUSEA: ICD-10-CM

## 2019-09-26 PROCEDURE — 99214 OFFICE O/P EST MOD 30 MIN: CPT | Mod: S$GLB,,, | Performed by: NURSE PRACTITIONER

## 2019-09-26 PROCEDURE — 99999 PR PBB SHADOW E&M-EST. PATIENT-LVL IV: ICD-10-PCS | Mod: PBBFAC,,, | Performed by: NURSE PRACTITIONER

## 2019-09-26 PROCEDURE — 99999 PR PBB SHADOW E&M-EST. PATIENT-LVL IV: CPT | Mod: PBBFAC,,, | Performed by: NURSE PRACTITIONER

## 2019-09-26 PROCEDURE — 3008F BODY MASS INDEX DOCD: CPT | Mod: CPTII,S$GLB,, | Performed by: NURSE PRACTITIONER

## 2019-09-26 PROCEDURE — 99214 PR OFFICE/OUTPT VISIT, EST, LEVL IV, 30-39 MIN: ICD-10-PCS | Mod: S$GLB,,, | Performed by: NURSE PRACTITIONER

## 2019-09-26 PROCEDURE — 3008F PR BODY MASS INDEX (BMI) DOCUMENTED: ICD-10-PCS | Mod: CPTII,S$GLB,, | Performed by: NURSE PRACTITIONER

## 2019-09-26 RX ORDER — PANTOPRAZOLE SODIUM 40 MG/1
TABLET, DELAYED RELEASE ORAL
Status: ON HOLD | COMMUNITY
Start: 2019-09-21 | End: 2019-10-22

## 2019-09-26 NOTE — TELEPHONE ENCOUNTER
----- Message from Bernadette Wynn sent at 2019  8:11 AM CDT -----  Contact: self  Hernando Roman  MRN: [id number]  : 1980  PCP: Merlin Quintero  Home Phone      [phone]  Work Phone      [phone]  Mobile          [phone]      MESSAGE:   Patient would like to get a call from a nurse. He wants to se about getting a HIDA SCAN.      [phone]

## 2019-09-26 NOTE — TELEPHONE ENCOUNTER
I spoke w/ patient, he reports that he has continued to have pain in his abdomen, actually went to the ER yesterday thinking it was his heart. His workup was normal, however they suggested he have a HIDA scan. I informed him this is something that has to be ordered at a visit, appt scheduled for tomorrow with

## 2019-09-26 NOTE — PATIENT INSTRUCTIONS
Abdominal Pain    Abdominal pain is pain in the stomach or belly area. Everyone has this pain from time to time. In many cases it goes away on its own. But abdominal pain can sometimes be due to a serious problem, such as appendicitis. So its important to know when to seek help.  Causes of abdominal pain  There are many possible causes of abdominal pain. Common causes in adults include:  · Constipation, diarrhea, or gas  · Stomach acid flowing back up into the esophagus (acid reflux or heartburn)  · Severe acid reflux, called GERD (gastroesophageal reflux disease)  · A sore in the lining of the stomach or small intestine (peptic ulcer)  · Inflammation of the gallbladder, liver, or pancreas  · Gallstones or kidney stones  · Appendicitis   · Intestinal blockage   · An internal organ pushing through a muscle or other tissue (hernia)  · Urinary tract infections  · In women, menstrual cramps, fibroids, or endometriosis  · Inflammation or infection of the intestines  Diagnosing the cause of abdominal pain  Your healthcare provider will do a physical exam help find the cause of your pain. If needed, tests will be ordered. Belly pain has many possible causes. So it can be hard to find the reason for your pain. Giving details about your pain can help. Tell your provider where and when you feel the pain, and what makes it better or worse. Also let your provider know if you have other symptoms such as:  · Fever  · Tiredness  · Upset stomach (nausea)  · Vomiting  · Changes in bathroom habits  Treating abdominal pain  Some causes of pain need emergency medical treatment right away. These include appendicitis or a bowel blockage. Other problems can be treated with rest, fluids, or medicines. Your healthcare provider can give you specific instructions for treatment or self-care based on what is causing your pain.  If you have vomiting or diarrhea, sip water or other clear fluids. When you are ready to eat solid foods again,  start with small amounts of easy-to-digest, low-fat foods. These include apple sauce, toast, or crackers.   When to seek medical care  Call 911 or go to the hospital right away if you:  · Cant pass stool and are vomiting  · Are vomiting blood or have bloody diarrhea or black, tarry diarrhea  · Have chest, neck, or shoulder pain  · Feel like you might pass out  · Have pain in your shoulder blades with nausea  · Have sudden, severe belly pain  · Have new, severe pain unlike any you have felt before  · Have a belly that is rigid, hard, and tender to touch  Call your healthcare provider if you have:  · Pain for more than 5 days  · Bloating for more than 2 days  · Diarrhea for more than 5 days  · A fever of 100.4°F (38.0°C) or higher, or as directed by your provider  · Pain that gets worse  · Weight loss for no reason  · Continued lack of appetite  · Blood in your stool  How to prevent abdominal pain  Here are some tips to help prevent abdominal pain:  · Eat smaller amounts of food at one time.  · Avoid greasy, fried, or other high-fat foods.  · Avoid foods that give you gas.  · Exercise regularly.  · Drink plenty of fluids.  To help prevent GERD symptoms:  · Quit smoking.  · Reduce alcohol and certain foods that increase stomach acid.  · Avoid aspirin and over-the-counter pain and fever medicines (NSAIDS or nonsteroidal anti-inflammatory drugs), if possible  · Lose extra weight.  · Finish eating at least 2 hours before you go to bed or lie down.  · Raise the head of your bed.  Date Last Reviewed: 7/1/2016  © 7599-2507 [employer]. [address] [place] [address], [place] [address]. All rights reserved. This information is not intended as a substitute for professional medical care. Always follow your healthcare professional's instructions.

## 2019-09-26 NOTE — PROGRESS NOTES
Subjective:       Patient ID: Hernando Roman is a 39 y.o. male.    Chief Complaint: Follow-up (X ER-  x yesterday - abdominal pain R. side PS:6)    Patient is known, to me and presents with   Chief Complaint   Patient presents with    Follow-up     X ER-  x yesterday - abdominal pain R. side PS:6   .  Denies chest pain and shortness of breath.  Patient presents with possible gallbladder disease due to presenting s/s and ongoing s/s. Has hx of h pylori   HPI  Review of Systems   Constitutional: Negative.  Negative for activity change, appetite change, chills, diaphoresis, fatigue, fever and unexpected weight change.   HENT: Negative.  Negative for congestion, ear discharge, ear pain, facial swelling, hearing loss, nosebleeds, postnasal drip, rhinorrhea, sinus pressure, sneezing, sore throat, tinnitus, trouble swallowing and voice change.    Eyes: Negative.  Negative for photophobia, pain, discharge, redness, itching and visual disturbance.   Respiratory: Negative.  Negative for apnea, cough, choking, chest tightness, shortness of breath, wheezing and stridor.    Cardiovascular: Negative.  Negative for chest pain, palpitations and leg swelling.   Gastrointestinal: Positive for abdominal pain and nausea. Negative for abdominal distention, anal bleeding, blood in stool, constipation, diarrhea, rectal pain and vomiting.   Genitourinary: Negative.  Negative for difficulty urinating, discharge, dysuria, enuresis, flank pain, frequency, hematuria, penile pain, penile swelling, scrotal swelling, testicular pain and urgency.   Musculoskeletal: Negative.  Negative for arthralgias, back pain, gait problem, joint swelling, myalgias, neck pain and neck stiffness.   Skin: Negative.  Negative for color change, pallor, rash and wound.   Neurological: Negative for dizziness, tremors, seizures, syncope, facial asymmetry, speech difficulty, weakness, light-headedness, numbness and headaches.   Hematological: Negative for adenopathy.  Does not bruise/bleed easily.   Psychiatric/Behavioral: Negative.  Negative for agitation, dysphoric mood, sleep disturbance and suicidal ideas. The patient is not nervous/anxious.        Objective:      Physical Exam   Constitutional: He is oriented to person, place, and time. He appears well-developed and well-nourished. No distress.   HENT:   Head: Normocephalic and atraumatic.   Right Ear: External ear normal.   Left Ear: External ear normal.   Nose: Nose normal.   Mouth/Throat: Oropharynx is clear and moist. No oropharyngeal exudate.   Eyes: Pupils are equal, round, and reactive to light. Conjunctivae and EOM are normal. Right eye exhibits no discharge. Left eye exhibits no discharge.   Neck: Normal range of motion. Neck supple. No JVD present. No tracheal deviation present. No thyromegaly present.   Cardiovascular: Normal rate, regular rhythm, normal heart sounds and intact distal pulses. Exam reveals no gallop and no friction rub.   No murmur heard.  Pulmonary/Chest: Effort normal and breath sounds normal. No stridor. No respiratory distress. He has no wheezes. He has no rales. He exhibits no tenderness.   Abdominal: Soft. Bowel sounds are normal. He exhibits no distension and no mass. There is no hepatosplenomegaly. There is tenderness in the right upper quadrant. There is no rebound and no guarding. No hernia.       Musculoskeletal: Normal range of motion. He exhibits no edema or tenderness.   Lymphadenopathy:     He has no cervical adenopathy.   Neurological: He is alert and oriented to person, place, and time. He has normal reflexes. He displays normal reflexes. No cranial nerve deficit. He exhibits normal muscle tone. Coordination normal.   Skin: Skin is warm and dry. Capillary refill takes less than 2 seconds. No rash noted. He is not diaphoretic. No erythema. No pallor.   Psychiatric: He has a normal mood and affect. His behavior is normal. Judgment and thought content normal.   Nursing note and vitals  "reviewed.      Assessment:       1. Right upper quadrant pain    2. Nausea        Plan:   Hernando was seen today for follow-up.    Diagnoses and all orders for this visit:    Right upper quadrant pain  -     US Abdomen Limited; Future  -     H. pylori antigen, stool; Future    Nausea  -     US Abdomen Limited; Future  -     H. pylori antigen, stool; Future    "This note will not be shared with the patient."  Will do above testing and if no stones will proceed to HIDA scan  If hida scan negative than will send to GI  rtc as scheduled   "

## 2019-09-27 ENCOUNTER — TELEPHONE (OUTPATIENT)
Dept: INTERNAL MEDICINE | Facility: CLINIC | Age: 39
End: 2019-09-27

## 2019-09-30 ENCOUNTER — HOSPITAL ENCOUNTER (OUTPATIENT)
Dept: RADIOLOGY | Facility: HOSPITAL | Age: 39
Discharge: HOME | End: 2019-09-30
Attending: NURSE PRACTITIONER
Payer: COMMERCIAL

## 2019-09-30 DIAGNOSIS — R10.11 RIGHT UPPER QUADRANT PAIN: ICD-10-CM

## 2019-09-30 DIAGNOSIS — R11.0 NAUSEA: ICD-10-CM

## 2019-09-30 PROCEDURE — 76705 ECHO EXAM OF ABDOMEN: CPT | Mod: 26,,, | Performed by: RADIOLOGY

## 2019-09-30 PROCEDURE — 76705 ECHO EXAM OF ABDOMEN: CPT | Mod: TC

## 2019-09-30 PROCEDURE — 76705 US ABDOMEN LIMITED: ICD-10-PCS | Mod: 26,,, | Performed by: RADIOLOGY

## 2019-10-01 ENCOUNTER — LAB VISIT (OUTPATIENT)
Dept: LAB | Facility: HOSPITAL | Age: 39
End: 2019-10-01
Attending: NURSE PRACTITIONER
Payer: COMMERCIAL

## 2019-10-01 DIAGNOSIS — R10.11 RIGHT UPPER QUADRANT PAIN: ICD-10-CM

## 2019-10-01 DIAGNOSIS — R11.0 NAUSEA: ICD-10-CM

## 2019-10-01 PROCEDURE — 87338 HPYLORI STOOL AG IA: CPT

## 2019-10-03 ENCOUNTER — OFFICE VISIT (OUTPATIENT)
Dept: INTERNAL MEDICINE | Facility: CLINIC | Age: 39
End: 2019-10-03
Payer: COMMERCIAL

## 2019-10-03 VITALS
DIASTOLIC BLOOD PRESSURE: 76 MMHG | SYSTOLIC BLOOD PRESSURE: 110 MMHG | HEART RATE: 67 BPM | WEIGHT: 164.88 LBS | RESPIRATION RATE: 16 BRPM | BODY MASS INDEX: 24.42 KG/M2 | HEIGHT: 69 IN | OXYGEN SATURATION: 98 %

## 2019-10-03 DIAGNOSIS — R10.11 RUQ PAIN: ICD-10-CM

## 2019-10-03 PROCEDURE — 99214 OFFICE O/P EST MOD 30 MIN: CPT | Mod: S$GLB,,, | Performed by: NURSE PRACTITIONER

## 2019-10-03 PROCEDURE — 3008F PR BODY MASS INDEX (BMI) DOCUMENTED: ICD-10-PCS | Mod: CPTII,S$GLB,, | Performed by: NURSE PRACTITIONER

## 2019-10-03 PROCEDURE — 99999 PR PBB SHADOW E&M-EST. PATIENT-LVL IV: ICD-10-PCS | Mod: PBBFAC,,, | Performed by: NURSE PRACTITIONER

## 2019-10-03 PROCEDURE — 99214 PR OFFICE/OUTPT VISIT, EST, LEVL IV, 30-39 MIN: ICD-10-PCS | Mod: S$GLB,,, | Performed by: NURSE PRACTITIONER

## 2019-10-03 PROCEDURE — 99999 PR PBB SHADOW E&M-EST. PATIENT-LVL IV: CPT | Mod: PBBFAC,,, | Performed by: NURSE PRACTITIONER

## 2019-10-03 PROCEDURE — 3008F BODY MASS INDEX DOCD: CPT | Mod: CPTII,S$GLB,, | Performed by: NURSE PRACTITIONER

## 2019-10-03 NOTE — PATIENT INSTRUCTIONS
Abdominal Pain    Abdominal pain is pain in the stomach or belly area. Everyone has this pain from time to time. In many cases it goes away on its own. But abdominal pain can sometimes be due to a serious problem, such as appendicitis. So its important to know when to seek help.  Causes of abdominal pain  There are many possible causes of abdominal pain. Common causes in adults include:  · Constipation, diarrhea, or gas  · Stomach acid flowing back up into the esophagus (acid reflux or heartburn)  · Severe acid reflux, called GERD (gastroesophageal reflux disease)  · A sore in the lining of the stomach or small intestine (peptic ulcer)  · Inflammation of the gallbladder, liver, or pancreas  · Gallstones or kidney stones  · Appendicitis   · Intestinal blockage   · An internal organ pushing through a muscle or other tissue (hernia)  · Urinary tract infections  · In women, menstrual cramps, fibroids, or endometriosis  · Inflammation or infection of the intestines  Diagnosing the cause of abdominal pain  Your healthcare provider will do a physical exam help find the cause of your pain. If needed, tests will be ordered. Belly pain has many possible causes. So it can be hard to find the reason for your pain. Giving details about your pain can help. Tell your provider where and when you feel the pain, and what makes it better or worse. Also let your provider know if you have other symptoms such as:  · Fever  · Tiredness  · Upset stomach (nausea)  · Vomiting  · Changes in bathroom habits  Treating abdominal pain  Some causes of pain need emergency medical treatment right away. These include appendicitis or a bowel blockage. Other problems can be treated with rest, fluids, or medicines. Your healthcare provider can give you specific instructions for treatment or self-care based on what is causing your pain.  If you have vomiting or diarrhea, sip water or other clear fluids. When you are ready to eat solid foods again,  start with small amounts of easy-to-digest, low-fat foods. These include apple sauce, toast, or crackers.   When to seek medical care  Call 911 or go to the hospital right away if you:  · Cant pass stool and are vomiting  · Are vomiting blood or have bloody diarrhea or black, tarry diarrhea  · Have chest, neck, or shoulder pain  · Feel like you might pass out  · Have pain in your shoulder blades with nausea  · Have sudden, severe belly pain  · Have new, severe pain unlike any you have felt before  · Have a belly that is rigid, hard, and tender to touch  Call your healthcare provider if you have:  · Pain for more than 5 days  · Bloating for more than 2 days  · Diarrhea for more than 5 days  · A fever of 100.4°F (38.0°C) or higher, or as directed by your provider  · Pain that gets worse  · Weight loss for no reason  · Continued lack of appetite  · Blood in your stool  How to prevent abdominal pain  Here are some tips to help prevent abdominal pain:  · Eat smaller amounts of food at one time.  · Avoid greasy, fried, or other high-fat foods.  · Avoid foods that give you gas.  · Exercise regularly.  · Drink plenty of fluids.  To help prevent GERD symptoms:  · Quit smoking.  · Reduce alcohol and certain foods that increase stomach acid.  · Avoid aspirin and over-the-counter pain and fever medicines (NSAIDS or nonsteroidal anti-inflammatory drugs), if possible  · Lose extra weight.  · Finish eating at least 2 hours before you go to bed or lie down.  · Raise the head of your bed.  Date Last Reviewed: 7/1/2016  © 9872-0962 [employer]. [address] [place] [address], [place] [address]. All rights reserved. This information is not intended as a substitute for professional medical care. Always follow your healthcare professional's instructions.

## 2019-10-03 NOTE — PROGRESS NOTES
Subjective:       Patient ID: Hernando Roman is a 39 y.o. male.    Chief Complaint: Follow-up (x 1 wk)    Patient is known, to me and presents with   Chief Complaint   Patient presents with    Follow-up     x 1 wk   .  Denies chest pain and shortness of breath.  Patient presents with results ultrasound and still with some abdominal pain and nausea. Will need HIDA scan   HPI  Review of Systems   Constitutional: Negative.  Negative for activity change, appetite change, chills, diaphoresis, fatigue, fever and unexpected weight change.   HENT: Negative.  Negative for congestion, ear discharge, ear pain, facial swelling, hearing loss, nosebleeds, postnasal drip, rhinorrhea, sinus pressure, sneezing, sore throat, tinnitus, trouble swallowing and voice change.    Eyes: Negative.  Negative for photophobia, pain, discharge, redness, itching and visual disturbance.   Respiratory: Negative.  Negative for apnea, cough, choking, chest tightness, shortness of breath, wheezing and stridor.    Cardiovascular: Negative.  Negative for chest pain, palpitations and leg swelling.   Gastrointestinal: Positive for abdominal pain, constipation and nausea. Negative for abdominal distention, anal bleeding, blood in stool, diarrhea and vomiting.   Genitourinary: Negative.  Negative for difficulty urinating, discharge, dysuria, enuresis, flank pain, frequency, hematuria, penile pain, penile swelling, scrotal swelling, testicular pain and urgency.   Musculoskeletal: Negative.  Negative for arthralgias, back pain, gait problem, joint swelling, myalgias, neck pain and neck stiffness.   Skin: Negative.  Negative for color change, pallor, rash and wound.   Neurological: Positive for headaches. Negative for dizziness, tremors, seizures, syncope, facial asymmetry, speech difficulty, weakness, light-headedness and numbness.   Hematological: Negative for adenopathy. Does not bruise/bleed easily.   Psychiatric/Behavioral: Negative.  Negative for  agitation, dysphoric mood, sleep disturbance and suicidal ideas. The patient is not nervous/anxious.        Objective:      Physical Exam   Constitutional: He is oriented to person, place, and time. He appears well-developed and well-nourished. No distress.   HENT:   Head: Normocephalic and atraumatic.   Right Ear: External ear normal.   Left Ear: External ear normal.   Nose: Nose normal.   Mouth/Throat: Oropharynx is clear and moist. No oropharyngeal exudate.   Eyes: Pupils are equal, round, and reactive to light. Conjunctivae and EOM are normal. Right eye exhibits no discharge. Left eye exhibits no discharge. No scleral icterus.   Neck: Normal range of motion. Neck supple. No JVD present. No tracheal deviation present. No thyromegaly present.   Cardiovascular: Normal rate, regular rhythm, normal heart sounds and intact distal pulses. Exam reveals no gallop and no friction rub.   No murmur heard.  Pulmonary/Chest: Effort normal and breath sounds normal. No stridor. No respiratory distress. He has no wheezes. He has no rales. He exhibits no tenderness.   Abdominal: Soft. Bowel sounds are normal. He exhibits no distension and no mass. There is no hepatosplenomegaly. There is tenderness in the right upper quadrant. There is no rebound and no guarding. No hernia.       Musculoskeletal: Normal range of motion. He exhibits no edema or tenderness.   Lymphadenopathy:     He has no cervical adenopathy.   Neurological: He is alert and oriented to person, place, and time. He has normal reflexes. He displays normal reflexes. No cranial nerve deficit. He exhibits normal muscle tone. Coordination normal.   Skin: Skin is warm and dry. Capillary refill takes less than 2 seconds. No rash noted. He is not diaphoretic. No erythema. No pallor.   Psychiatric: He has a normal mood and affect. His behavior is normal. Judgment and thought content normal.   Nursing note and vitals reviewed.      Assessment:       1. RUQ pain        Plan:  "  Hernando was seen today for follow-up.    Diagnoses and all orders for this visit:    RUQ pain  -     NM Hepatobiliary (HIDA) W Pharm and Ejec; Future    "This note will not be shared with the patient."  Will call with results and make necessary referrals  rtc as scheduled  "

## 2019-10-05 LAB — H PYLORI AG STL QL IA: NOT DETECTED

## 2019-10-09 ENCOUNTER — HOSPITAL ENCOUNTER (OUTPATIENT)
Dept: RADIOLOGY | Facility: HOSPITAL | Age: 39
Discharge: HOME | End: 2019-10-09
Attending: NURSE PRACTITIONER
Payer: COMMERCIAL

## 2019-10-09 DIAGNOSIS — R10.11 RUQ PAIN: ICD-10-CM

## 2019-10-09 PROCEDURE — 78227 NM HEPATOBILIARY(HIDA) WITH PHARM AND EF: ICD-10-PCS | Mod: 26,,, | Performed by: RADIOLOGY

## 2019-10-09 PROCEDURE — 78227 HEPATOBIL SYST IMAGE W/DRUG: CPT | Mod: 26,,, | Performed by: RADIOLOGY

## 2019-10-09 PROCEDURE — A9537 TC99M MEBROFENIN: HCPCS

## 2019-10-10 ENCOUNTER — TELEPHONE (OUTPATIENT)
Dept: INTERNAL MEDICINE | Facility: CLINIC | Age: 39
End: 2019-10-10

## 2019-10-10 DIAGNOSIS — K21.9 GASTROESOPHAGEAL REFLUX DISEASE WITHOUT ESOPHAGITIS: Primary | ICD-10-CM

## 2019-10-10 NOTE — TELEPHONE ENCOUNTER
Its borderline at 35.5% so I don't think a surgeon would remove it since it really isn't suggestive of a decrease functioning gallbladder. Maybe needs to see GI to get to bottom of the problem

## 2019-10-10 NOTE — TELEPHONE ENCOUNTER
Pt notified stating he would still like to see general surgeon to discuss getting it out due to symptoms worsening so if referral can be put in for this  Please advise  Thanks!

## 2019-10-10 NOTE — TELEPHONE ENCOUNTER
----- Message from Radhika Watkins MA sent at 10/10/2019  2:02 PM CDT -----  Contact: Wife  Patient is calling for results of HIDA Scan that was done yesterday    Please call [phone]

## 2019-10-10 NOTE — TELEPHONE ENCOUNTER
Pt called back stating he would like to GI than now instead of general surgeon for whoever you suggested for this  Please advise  Thanks!

## 2019-10-11 NOTE — TELEPHONE ENCOUNTER
Referral is in per Dorinda, just need to call pt with the next available appt   please advise  Thanks!

## 2019-10-17 ENCOUNTER — HOSPITAL ENCOUNTER (OUTPATIENT)
Dept: PREADMISSION TESTING | Facility: HOSPITAL | Age: 39
Discharge: HOME | End: 2019-10-17
Attending: SURGERY
Payer: COMMERCIAL

## 2019-10-17 ENCOUNTER — ANESTHESIA EVENT (OUTPATIENT)
Dept: SURGERY | Facility: HOSPITAL | Age: 39
End: 2019-10-17
Payer: COMMERCIAL

## 2019-10-17 NOTE — ANESTHESIA PREPROCEDURE EVALUATION
10/17/2019  Hernando Roman is a 39 y.o., male.    Pre-op Assessment    I have reviewed the Patient Summary Reports.     I have reviewed the Nursing Notes.   I have reviewed the Medications.     Review of Systems  Anesthesia Hx:  No previous Anesthesia  Denies Family Hx of Anesthesia complications.   Denies Personal Hx of Anesthesia complications.   Social:  Former Smoker    Hematology/Oncology:  Hematology Normal   Oncology Normal     EENT/Dental:   chronic allergic rhinitis   Cardiovascular:   Exercise tolerance: good no hyperlipidemia    Pulmonary:  Pulmonary Normal    Renal/:  Renal/ Normal     Hepatic/GI:  Hepatic/GI Normal Acute appendicitis   Musculoskeletal:   Denies Arthritis.   Ankylosing Spondylitis    Neurological:   Headaches    Endocrine:  Endocrine Normal    Dermatological:  Skin Normal        Physical Exam  General:  Well nourished    Airway/Jaw/Neck:  Airway Findings: Mouth Opening: Normal Tongue: Normal  General Airway Assessment: Adult  Mallampati: II  TM Distance: Normal, at least 6 cm  Jaw/Neck Findings:  Neck ROM: Normal ROM     Eyes/Ears/Nose:  EYES/EARS/NOSE FINDINGS: Normal   Dental:  Dental Findings: Periodontal disease, Mild         Mental Status:  Mental Status Findings: Normal        Anesthesia Plan  Type of Anesthesia, risks & benefits discussed:  Anesthesia Type:  general  Patient's Preference:   Intra-op Monitoring Plan: standard ASA monitors  Intra-op Monitoring Plan Comments:   Post Op Pain Control Plan: multimodal analgesia  Post Op Pain Control Plan Comments:   Induction:   IV  Beta Blocker:  Patient is not currently on a Beta-Blocker (No further documentation required).       Informed Consent: Patient understands risks and agrees with Anesthesia plan.  Questions answered. Anesthesia consent signed with patient.  ASA Score: 2     Day of Surgery Review of History &  Physical: I have interviewed and examined the patient. I have reviewed the patient's H&P dated: 10/22/19. There are no significant changes.  H&P update referred to the surgeon.         Ready For Surgery From Anesthesia Perspective.

## 2019-10-17 NOTE — DISCHARGE INSTRUCTIONS
[facility]  Pre Admit Instructions    Day and Date of Procedure: Tuesday 10/22/19  Arrival time: 915am      · Call your doctor if you become ill before your surgery  · Someone will call you between 1 p.m. And 5 p.m.the workday before the procedure to give you an arrival time       - 7 a.m. To 5 p.m. Enter through Patient Registration Main Lobby  · You must have a responsible  to bring you home    Do NOT eat past midnight the night before your procedure  Ok to have clear liquids -NO Dairy- until 7am the morning of your procedure  Please    · Do not wear makeup, jewelry, nail polish or body piercings  · Bring containers/solution for contacts, dentures, bridges - these and hearing aids will be removed before your procedure  · Do not bring cash, jewelry or valuables the day of your procedure   · No smoking at least 24 hours before your procedure  · Wear clothing that is comfortable and easy to take off and put on  · Do NOT shave for at least 5 days before your surgery    Review skin preparation handout before using. Shower with Hibiclens the Night before the procedure.                 Information about your stay (Please Review)    Before Surgery  1. Cafeteria Meals: 7am to 10am; 11am to 1:30 pm; Dinner/Supper must may be ordered between 11:00 am and 4 pm from the [facility] After [employer] Menu. Food will be available to  between 5 pm and 6 pm. The kitchen phone extension is [phone].  2. Your doctor may order and review labs, x-rays, ECG or other tests as a pre-surgery workup and will call you if there is need for follow up.  3. No smoking inside or outside the hospital on hospital grounds.  4. Wear clothing that is easy to take off and put on.  The hospital will provide you with a gown.  5. You may bring robe, slippers, nightwear, and toiletries (toothbrush, toothpaste, makeup).  6. If your doctor orders a Fleets Enema or other prep, follow package and/or doctors orders.  7. Brush your teeth  and rinse your mouth the morning of surgery, but dont swallow the water.  8. The nurse will ask questions and check your condition.  The doctor may marely your surgical site.  9. Compression boots may be put on your calves to reduce the risk of blood clots.  10. The doctor may order medicine to help you relax before surgery.  After Surgery  1. The nurse will check your temperature, breathing, blood pressure, heart rate, IV site, and surgery site.  2. A diet will be ordered-most start with ice chips and then advance slowly to other foods.  3. If you have IV fluids the IV pump will beep to let the nurse know that she needs to check it.  4. You may have a urinary catheter and staff may measure your oral intake and urine output.  5. Pain medication may be ordered by the doctor after surgery.  If you have a pain management device tell your caretakers not to press the button because of OVERDOSE RISK.  6. When the nurse or doctor tells you it is okay to get out of bed, ask for help until you are stable.  7. The nurse may ask you to turn, cough, and deep breathe to prevent lung problems.  You can use a pillow to hold your incision when you deep breathe or cough to reduce pain.  8. The nurse will give you discharge instructions--incision care, symptoms to report to your doctor, and your follow-up appointment when you are discharged.  You cannot drive yourself home.  Goal for Discharge from One Day Surgery  · Control pain with an oral medication  · Walk without feeling dizzy or weak  · Tolerate liquids well  · Urinate without difficulty    Things you can do to  Reduce the Risk of Infections or Complications  Wash Hands and use Waterless Hand Sanitizers  · Wash hands frequently with soap and warm water for at least 15 seconds.   · Use hand sanitizers (alcohol based) often at home and in public if hands are not visibly soiled  Take Antibiotic Exactly as Prescribed  · Do not stop antibiotics too soon; you risk developing  infection resistant to antibiotics  · Take your antibiotic even if you are feeling better and even if they upset your stomach  · Call the doctor if you cant tolerate the antibiotic or you have an allergic reaction  Stay Healthy  · Take medicines as prescribed by your doctor  · Keep your diabetes under control - diet and medication  · Get enough rest, exercise and eat a healthy diet  Keep the Wound Clean and Dry  · Wash hands before and after taking care of the incision (cut)  · Wash hands when you remove a dressing, before you touch/apply a new dressing  · Shower and clean incision with antibacterial soap and rinse well if the doctor approves  · Allow the cut to dry completely before putting on a clean dressing  · Do not touch the part of the bandage that will cover the incision  · Do not use ointments unless your doctor tells you to-can promote bacterial growth  · If ordered, put ointment directly on the dressing-do not touch the end of the tube  · Do not scrub, remove scabs, or leave a damp dressing on the incision  · Do not use peroxide or alcohol to clean the incision unless the doctor tells you to   · Do not let children, pets or anyone else contaminate the incision  Stop Smoking To Prevent Infection  · Stop smoking-Centers for Disease Control recommends 30 days before surgery  · Smokers get more infections after surgery-studies have shown 6 times the risk  · Smokers have more scarring and heal slower-open wounds get infected easier  Prevent Respiratory complications  · Stop smoking  · Turn, cough, and deep breathe even if you have some pain when you do so.  · Splint your incision with a pillow when you cough/deep breath, to help control pain.  · Do not lie in one position for long periods of time.   Prevent Blood Clots  · When you wake move your legs, flex your feet, rotate your ankles, wiggle your toes  · Get up when the doctor says its ok.  Dangle your feet from the side of the bed  · Report symptoms-leg  pain, redness/swelling, warm to touch; fever; shortness of breath, chest pain, severe upper back pain.

## 2019-10-22 ENCOUNTER — ANESTHESIA (OUTPATIENT)
Dept: SURGERY | Facility: HOSPITAL | Age: 39
End: 2019-10-22
Payer: COMMERCIAL

## 2019-10-22 ENCOUNTER — HOSPITAL ENCOUNTER (OUTPATIENT)
Facility: HOSPITAL | Age: 39
Discharge: HOME | End: 2019-10-22
Attending: SURGERY | Admitting: SURGERY
Payer: COMMERCIAL

## 2019-10-22 VITALS
OXYGEN SATURATION: 95 % | TEMPERATURE: 98 F | DIASTOLIC BLOOD PRESSURE: 66 MMHG | HEART RATE: 101 BPM | RESPIRATION RATE: 20 BRPM | SYSTOLIC BLOOD PRESSURE: 136 MMHG | WEIGHT: 168.31 LBS | BODY MASS INDEX: 24.93 KG/M2 | HEIGHT: 69 IN

## 2019-10-22 DIAGNOSIS — K82.8 BILIARY DYSKINESIA: Primary | ICD-10-CM

## 2019-10-22 LAB — POCT GLUCOSE: 85 MG/DL (ref 70–110)

## 2019-10-22 PROCEDURE — 25000003 PHARM REV CODE 250: Performed by: NURSE ANESTHETIST, CERTIFIED REGISTERED

## 2019-10-22 PROCEDURE — 71000033 HC RECOVERY, INTIAL HOUR: Performed by: SURGERY

## 2019-10-22 PROCEDURE — 63600175 PHARM REV CODE 636 W HCPCS: Performed by: NURSE ANESTHETIST, CERTIFIED REGISTERED

## 2019-10-22 PROCEDURE — 25000003 PHARM REV CODE 250: Performed by: SURGERY

## 2019-10-22 PROCEDURE — 37000008 HC ANESTHESIA 1ST 15 MINUTES: Performed by: SURGERY

## 2019-10-22 PROCEDURE — 47562 PR LAP,CHOLECYSTECTOMY: ICD-10-PCS | Mod: ,,, | Performed by: SURGERY

## 2019-10-22 PROCEDURE — 63600175 PHARM REV CODE 636 W HCPCS: Performed by: SURGERY

## 2019-10-22 PROCEDURE — 36000708 HC OR TIME LEV III 1ST 15 MIN: Performed by: SURGERY

## 2019-10-22 PROCEDURE — 27201423 OPTIME MED/SURG SUP & DEVICES STERILE SUPPLY: Performed by: SURGERY

## 2019-10-22 PROCEDURE — 37000009 HC ANESTHESIA EA ADD 15 MINS: Performed by: SURGERY

## 2019-10-22 PROCEDURE — 88304 TISSUE SPECIMEN TO PATHOLOGY - SURGERY: ICD-10-PCS | Mod: 26,,, | Performed by: PATHOLOGY

## 2019-10-22 PROCEDURE — 00790 ANES IPER UPR ABD NOS: CPT | Mod: QZ,P2 | Performed by: NURSE ANESTHETIST, CERTIFIED REGISTERED

## 2019-10-22 PROCEDURE — 47562 LAPAROSCOPIC CHOLECYSTECTOMY: CPT | Mod: ,,, | Performed by: SURGERY

## 2019-10-22 PROCEDURE — 88304 TISSUE EXAM BY PATHOLOGIST: CPT | Mod: 26,,, | Performed by: PATHOLOGY

## 2019-10-22 PROCEDURE — S0028 INJECTION, FAMOTIDINE, 20 MG: HCPCS | Performed by: NURSE ANESTHETIST, CERTIFIED REGISTERED

## 2019-10-22 PROCEDURE — 36000709 HC OR TIME LEV III EA ADD 15 MIN: Performed by: SURGERY

## 2019-10-22 PROCEDURE — 88304 TISSUE EXAM BY PATHOLOGIST: CPT | Performed by: PATHOLOGY

## 2019-10-22 RX ORDER — MIDAZOLAM HYDROCHLORIDE 1 MG/ML
INJECTION INTRAMUSCULAR; INTRAVENOUS
Status: DISCONTINUED | OUTPATIENT
Start: 2019-10-22 | End: 2019-10-22

## 2019-10-22 RX ORDER — NEOSTIGMINE METHYLSULFATE 5 MG/5 ML
SYRINGE (ML) INTRAVENOUS
Status: DISCONTINUED | OUTPATIENT
Start: 2019-10-22 | End: 2019-10-22

## 2019-10-22 RX ORDER — ROCURONIUM BROMIDE 10 MG/ML
INJECTION, SOLUTION INTRAVENOUS
Status: DISCONTINUED | OUTPATIENT
Start: 2019-10-22 | End: 2019-10-22

## 2019-10-22 RX ORDER — ONDANSETRON 2 MG/ML
4 INJECTION INTRAMUSCULAR; INTRAVENOUS EVERY 12 HOURS PRN
Status: DISCONTINUED | OUTPATIENT
Start: 2019-10-22 | End: 2019-10-22 | Stop reason: HOSPADM

## 2019-10-22 RX ORDER — PHENYLEPHRINE HYDROCHLORIDE 10 MG/ML
INJECTION INTRAVENOUS
Status: DISCONTINUED | OUTPATIENT
Start: 2019-10-22 | End: 2019-10-22

## 2019-10-22 RX ORDER — DEXAMETHASONE SODIUM PHOSPHATE 4 MG/ML
INJECTION, SOLUTION INTRA-ARTICULAR; INTRALESIONAL; INTRAMUSCULAR; INTRAVENOUS; SOFT TISSUE
Status: DISCONTINUED | OUTPATIENT
Start: 2019-10-22 | End: 2019-10-22

## 2019-10-22 RX ORDER — KETOROLAC TROMETHAMINE 30 MG/ML
INJECTION, SOLUTION INTRAMUSCULAR; INTRAVENOUS
Status: DISCONTINUED | OUTPATIENT
Start: 2019-10-22 | End: 2019-10-22

## 2019-10-22 RX ORDER — ONDANSETRON 4 MG/1
8 TABLET, FILM COATED ORAL EVERY 6 HOURS PRN
COMMUNITY
End: 2022-02-03

## 2019-10-22 RX ORDER — FENTANYL CITRATE 50 UG/ML
INJECTION, SOLUTION INTRAMUSCULAR; INTRAVENOUS
Status: DISCONTINUED | OUTPATIENT
Start: 2019-10-22 | End: 2019-10-22

## 2019-10-22 RX ORDER — ONDANSETRON HYDROCHLORIDE 2 MG/ML
INJECTION, SOLUTION INTRAMUSCULAR; INTRAVENOUS
Status: DISCONTINUED | OUTPATIENT
Start: 2019-10-22 | End: 2019-10-22

## 2019-10-22 RX ORDER — ACETAMINOPHEN 10 MG/ML
INJECTION, SOLUTION INTRAVENOUS
Status: DISCONTINUED | OUTPATIENT
Start: 2019-10-22 | End: 2019-10-22

## 2019-10-22 RX ORDER — LIDOCAINE HYDROCHLORIDE 20 MG/ML
INJECTION, SOLUTION EPIDURAL; INFILTRATION; INTRACAUDAL; PERINEURAL
Status: DISCONTINUED | OUTPATIENT
Start: 2019-10-22 | End: 2019-10-22

## 2019-10-22 RX ORDER — SODIUM CHLORIDE 9 MG/ML
INJECTION, SOLUTION INTRAVENOUS CONTINUOUS
Status: DISCONTINUED | OUTPATIENT
Start: 2019-10-22 | End: 2019-10-22 | Stop reason: HOSPADM

## 2019-10-22 RX ORDER — HYDROMORPHONE HYDROCHLORIDE 1 MG/ML
1 INJECTION, SOLUTION INTRAMUSCULAR; INTRAVENOUS; SUBCUTANEOUS EVERY 4 HOURS PRN
Status: DISCONTINUED | OUTPATIENT
Start: 2019-10-22 | End: 2019-10-22 | Stop reason: HOSPADM

## 2019-10-22 RX ORDER — BUPIVACAINE HYDROCHLORIDE AND EPINEPHRINE 5; 5 MG/ML; UG/ML
INJECTION, SOLUTION EPIDURAL; INTRACAUDAL; PERINEURAL
Status: DISCONTINUED | OUTPATIENT
Start: 2019-10-22 | End: 2019-10-22 | Stop reason: HOSPADM

## 2019-10-22 RX ORDER — HYDROCODONE BITARTRATE AND ACETAMINOPHEN 5; 325 MG/1; MG/1
1 TABLET ORAL EVERY 6 HOURS PRN
Qty: 20 TABLET | Refills: 0 | Status: SHIPPED | OUTPATIENT
Start: 2019-10-22 | End: 2022-02-03

## 2019-10-22 RX ORDER — GLYCOPYRROLATE 0.2 MG/ML
INJECTION INTRAMUSCULAR; INTRAVENOUS
Status: DISCONTINUED | OUTPATIENT
Start: 2019-10-22 | End: 2019-10-22

## 2019-10-22 RX ORDER — PROPOFOL 10 MG/ML
VIAL (ML) INTRAVENOUS
Status: DISCONTINUED | OUTPATIENT
Start: 2019-10-22 | End: 2019-10-22

## 2019-10-22 RX ORDER — SODIUM CHLORIDE, SODIUM LACTATE, POTASSIUM CHLORIDE, CALCIUM CHLORIDE 600; 310; 30; 20 MG/100ML; MG/100ML; MG/100ML; MG/100ML
INJECTION, SOLUTION INTRAVENOUS CONTINUOUS PRN
Status: DISCONTINUED | OUTPATIENT
Start: 2019-10-22 | End: 2019-10-22

## 2019-10-22 RX ORDER — HYDROCODONE BITARTRATE AND ACETAMINOPHEN 5; 325 MG/1; MG/1
1 TABLET ORAL EVERY 4 HOURS PRN
Status: DISCONTINUED | OUTPATIENT
Start: 2019-10-22 | End: 2019-10-22 | Stop reason: HOSPADM

## 2019-10-22 RX ORDER — FAMOTIDINE 10 MG/ML
INJECTION INTRAVENOUS
Status: DISCONTINUED | OUTPATIENT
Start: 2019-10-22 | End: 2019-10-22

## 2019-10-22 RX ADMIN — FAMOTIDINE 20 MG: 10 INJECTION INTRAVENOUS at 11:10

## 2019-10-22 RX ADMIN — GLYCOPYRROLATE 0.4 MG: 0.2 INJECTION INTRAMUSCULAR; INTRAVENOUS at 11:10

## 2019-10-22 RX ADMIN — FENTANYL CITRATE 50 MCG: 50 INJECTION, SOLUTION INTRAMUSCULAR; INTRAVENOUS at 12:10

## 2019-10-22 RX ADMIN — ACETAMINOPHEN 1000 MG: 10 INJECTION, SOLUTION INTRAVENOUS at 11:10

## 2019-10-22 RX ADMIN — ONDANSETRON 8 MG: 2 INJECTION, SOLUTION INTRAMUSCULAR; INTRAVENOUS at 11:10

## 2019-10-22 RX ADMIN — HYDROCODONE BITARTRATE AND ACETAMINOPHEN 1 TABLET: 5; 325 TABLET ORAL at 02:10

## 2019-10-22 RX ADMIN — FENTANYL CITRATE 150 MCG: 50 INJECTION, SOLUTION INTRAMUSCULAR; INTRAVENOUS at 11:10

## 2019-10-22 RX ADMIN — ROCURONIUM BROMIDE 30 MG: 10 INJECTION, SOLUTION INTRAVENOUS at 11:10

## 2019-10-22 RX ADMIN — MIDAZOLAM HYDROCHLORIDE 2 MG: 1 INJECTION, SOLUTION INTRAMUSCULAR; INTRAVENOUS at 11:10

## 2019-10-22 RX ADMIN — PHENYLEPHRINE HYDROCHLORIDE 100 MCG: 10 INJECTION INTRAVENOUS at 11:10

## 2019-10-22 RX ADMIN — LIDOCAINE HYDROCHLORIDE 60 MG: 20 INJECTION, SOLUTION EPIDURAL; INFILTRATION; INTRACAUDAL; PERINEURAL at 11:10

## 2019-10-22 RX ADMIN — DEXAMETHASONE SODIUM PHOSPHATE 8 MG: 4 INJECTION, SOLUTION INTRAMUSCULAR; INTRAVENOUS at 11:10

## 2019-10-22 RX ADMIN — PHENYLEPHRINE HYDROCHLORIDE 150 MCG: 10 INJECTION INTRAVENOUS at 11:10

## 2019-10-22 RX ADMIN — KETOROLAC TROMETHAMINE 30 MG: 30 INJECTION, SOLUTION INTRAMUSCULAR; INTRAVENOUS at 11:10

## 2019-10-22 RX ADMIN — SODIUM CHLORIDE: 0.9 INJECTION, SOLUTION INTRAVENOUS at 01:10

## 2019-10-22 RX ADMIN — PROPOFOL 200 MG: 10 INJECTION, EMULSION INTRAVENOUS at 11:10

## 2019-10-22 RX ADMIN — Medication 3 MG: at 11:10

## 2019-10-22 RX ADMIN — SODIUM CHLORIDE, SODIUM LACTATE, POTASSIUM CHLORIDE, AND CALCIUM CHLORIDE: .6; .31; .03; .02 INJECTION, SOLUTION INTRAVENOUS at 11:10

## 2019-10-22 NOTE — OP NOTE
[facility]  Cholecystectomy with IOC  Procedure Note    SUMMARY     Date of Procedure: 10/22/2019     Procedure:  Laparoscopic cholecystectomy    Provider: Livan Toscano Jr, MD    Assisting Provider:  None    Indications: This patient presents with symptomatic gallbladder disease and will undergo laparoscopic cholecystectomy.    Pre-Operative Diagnosis: Other specified disorder of gallbladder    Post-Operative Diagnosis: Other specified disorder of gallbladder    Anesthesia:  General    Technical Procedures Used:  Laparoscopic    Description of the Findings of the Procedure:     The patient was seen again in the Holding Room. The risks, benefits, complications, treatment options, and expected outcomes were discussed with the patient. The patient was taken to Operating Room, identified as Hernando Roman and the procedure verified as Laparoscopic Cholecystectomy. A Time Out was held and the above information confirmed.    General endotracheal anesthesia was then administered and tolerated well. After the induction, the abdomen was prepped in the usual sterile fashion.     Local anesthetic agent was injected prior to any skin incision.  The Veress needle was passed through the center of the umbilicus aspirated and flushed saline. Saline drop test was positive.  Pneumoperitoneum was established to 15 mm of mercury.  5 mm trocar was placed at the umbilicus and then the 5 mm 30 degree camera was placed inside the abdominal cavity.  There was no evidence of any intra-abdominal injuries.  Patient was then placed in reverse Trendelenburg right side up.  I placed 3 additional 5 mm trocars under direct vision.    The gallbladder was identified, the fundus grasped and retracted cephalad.  Patient had some clear thin adhesions to the wall of the gallbladder which were bluntly stripped down to expose the neck.   The infundibulum was grasped and retracted laterally, exposing the peritoneum overlying the  triangle of Calot. This was then divided and exposed in a blunt fashion. The cystic duct was clearly identified and bluntly dissected circumferentially. The junctions of the gallbladder, cystic duct and common bile duct were clearly identified prior to the division of any linear structure.     The cystic artery was identified and followed all the way up to the gallbladder wall. Two clips were placed proximally 1 distally the artery was transected.  Patient then had a small branch running posteriorly into the gallbladder which was clipped and transected.  I then made a large window between the cystic duct the gallbladder and the liver bed guar high upon the cystic plate and opening up the hepatocystic triangle.  Two clips were placed proximally on the duct 1 distally in the cystic duct was transected.    The gallbladder was dissected from the liver bed in retrograde fashion with the electrocautery. The gallbladder was removed. There was no spillage of any bile and no bleeding.    Pneumoperitoneum was completely reduced after viewing removal of the trocars under direct vision. The incisions were closed with 4 Monocryl subcuticular and then glue.    Instrument, sponge, and needle counts were correct at closure and at the conclusion of the case.     Cholecystitis without Cholelithiasis           Significant Surgical Tasks Conducted by the Assistant(s), if Applicable:     Complications: None; patient tolerated the procedure well.    Total IV Fluids: mL    Estimated Blood Loss (EBL): Minimal     Drains:            Implants:     Specimens: Gallbladder           Condition: stable    Disposition: PACU - hemodynamically stable.    Attestation: I performed the procedure.

## 2019-10-22 NOTE — ANESTHESIA POSTPROCEDURE EVALUATION
Anesthesia Post Evaluation    Patient: Hernando Roman    Procedure(s) Performed: Procedure(s) (LRB):  CHOLECYSTECTOMY, LAPAROSCOPIC (N/A)    Final Anesthesia Type: general  Patient location during evaluation: PACU  Patient participation: Yes- Able to Participate  Level of consciousness: awake and alert and oriented  Post-procedure vital signs: reviewed and stable  Pain management: adequate  Airway patency: patent  PONV status at discharge: No PONV  Anesthetic complications: no      Cardiovascular status: blood pressure returned to baseline and hemodynamically stable  Respiratory status: unassisted, spontaneous ventilation and room air  Hydration status: euvolemic  Follow-up not needed.          Vitals Value Taken Time   /70 10/22/2019 12:30 PM   Temp 36.1 °C (96.9 °F) 10/22/2019 10:30 AM   Pulse 50 10/22/2019 12:30 PM   Resp 18 10/22/2019 12:30 PM   SpO2 96 % 10/22/2019 12:30 PM         No case tracking events are documented in the log.      Pain/Skyler Score: Skyler Score: 8 (10/22/2019 12:10 PM)

## 2019-10-22 NOTE — BRIEF OP NOTE
[facility]  Brief Operative Note     SUMMARY     Surgery Date: 10/22/2019     Surgeon(s) and Role:     * Livan Toscano Jr., MD - Primary    Assisting Surgeon: None    Pre-op Diagnosis:  Biliary dyskinesia [K82.8]    Post-op Diagnosis:  Post-Op Diagnosis Codes:     * Biliary dyskinesia [K82.8]    Procedure(s) (LRB):  CHOLECYSTECTOMY, LAPAROSCOPIC (N/A)    Anesthesia: General    Description of the findings of the procedure:  Biliary dyskinesia    Findings/Key Components:  As above    Estimated Blood Loss:  5 mL         Specimens:   Specimen (12h ago, onward)     Start     Ordered    10/22/19 1103  Specimen to Pathology - Surgery  Once     Comments:  Pre-op:Biliary DyskinesiaPost-op:SameProcedure:Lap-CholeSpecimen:GallbladderPhysician: Everton      10/22/19 1142                Discharge Note    SUMMARY     Admit Date: 10/22/2019    Discharge Date and Time:  10/22/2019 12:06 PM    Hospital Course (synopsis of major diagnoses, care, treatment, and services provided during the course of the hospital stay):  Status post laparoscopic cholecystectomy, discharged home, regular diet, activity as tolerated, stable condition, follow up 1 week.      Final Diagnosis: Post-Op Diagnosis Codes:     * Biliary dyskinesia [K82.8]    Disposition: Home or Self Care    Follow Up/Patient Instructions:     Medications:  Reconciled Home Medications:      Medication List      START taking these medications    HYDROcodone-acetaminophen 5-325 mg per tablet  Commonly known as:  NORCO  Take 1 tablet by mouth every 6 (six) hours as needed for Pain.        CONTINUE taking these medications    ondansetron 4 MG tablet  Commonly known as:  ZOFRAN  Take 8 mg by mouth every 6 (six) hours as needed for Nausea.        STOP taking these medications    pantoprazole 40 MG tablet  Commonly known as:  PROTONIX     sucralfate 100 mg/mL suspension  Commonly known as:  CARAFATE          Discharge Procedure Orders   Diet general     No  dressing needed     Activity as tolerated     Follow-up Information     Livan Toscano Jr, MD. Call in 1 week.    Specialty:  General Surgery  Contact information:  [address] [facility], [address] [place], [place] [id number] [phone]

## 2019-10-22 NOTE — TRANSFER OF CARE
"Anesthesia Transfer of Care Note    Patient: Hernando Roman    Procedure(s) Performed: Procedure(s) (LRB):  CHOLECYSTECTOMY, LAPAROSCOPIC (N/A)    Patient location: PACU    Anesthesia Type: general    Transport from OR: Transported from OR on 6-10 L/min O2 by face mask with adequate spontaneous ventilation    Post pain: adequate analgesia    Post assessment: no apparent anesthetic complications and tolerated procedure well    Post vital signs: stable    Level of consciousness: sedated    Nausea/Vomiting: no nausea/vomiting    Complications: none    Transfer of care protocol was followed      Last vitals:   Visit Vitals  /76 (BP Location: Right arm, Patient Position: Lying)   Pulse (!) 52   Temp 36.1 °C (96.9 °F)   Resp 16   Ht 5' 9" (1.753 m)   Wt 76.3 kg (168 lb 4.8 oz)   SpO2 98%   BMI 24.85 kg/m²     "

## 2019-10-22 NOTE — PLAN OF CARE
Educated patient and wife regarding discharge instructions and provided discharge paperwork. Verbalized an understanding regarding follow-up appointments, after surgery care, and  Medications. Transported to front of hospital for home self care with his wife.

## 2019-10-22 NOTE — NURSING
Patient arrived ambulatory. Pre-op for gallbladder with Dr. Toscano.  Given hibiclens to shower and gown.  Questions answered for admission.  Wife at bedside.

## 2019-10-28 ENCOUNTER — PATIENT OUTREACH (OUTPATIENT)
Dept: ADMINISTRATIVE | Facility: OTHER | Age: 39
End: 2019-10-28

## 2019-10-29 ENCOUNTER — OFFICE VISIT (OUTPATIENT)
Dept: SURGERY | Facility: CLINIC | Age: 39
End: 2019-10-29
Payer: COMMERCIAL

## 2019-10-29 VITALS — BODY MASS INDEX: 24.81 KG/M2 | WEIGHT: 168 LBS

## 2019-10-29 DIAGNOSIS — K82.8 BILIARY DYSKINESIA: Primary | ICD-10-CM

## 2019-10-29 PROCEDURE — 99999 PR PBB SHADOW E&M-EST. PATIENT-LVL II: ICD-10-PCS | Mod: PBBFAC,,, | Performed by: SURGERY

## 2019-10-29 PROCEDURE — 99499 UNLISTED E&M SERVICE: CPT | Mod: S$GLB,,, | Performed by: SURGERY

## 2019-10-29 PROCEDURE — 99999 PR PBB SHADOW E&M-EST. PATIENT-LVL II: CPT | Mod: PBBFAC,,, | Performed by: SURGERY

## 2019-10-29 PROCEDURE — 99499 NO LOS: ICD-10-PCS | Mod: S$GLB,,, | Performed by: SURGERY

## 2019-10-29 NOTE — PROGRESS NOTES
Status post lap choly for biliary dyskinesia.  Patient states after surgery he noted immediate relief of his preop symptoms and abdominal pain.  He has been doing well since.  No nausea vomiting.  Tolerating diet.  Normal bowel movements.  Incisions healing without redness or infection.  Discharged from surgery clinic.  Follow-up as needed.

## 2020-10-05 ENCOUNTER — PATIENT MESSAGE (OUTPATIENT)
Dept: ADMINISTRATIVE | Facility: HOSPITAL | Age: 40
End: 2020-10-05

## 2021-01-04 ENCOUNTER — PATIENT MESSAGE (OUTPATIENT)
Dept: ADMINISTRATIVE | Facility: HOSPITAL | Age: 41
End: 2021-01-04

## 2021-04-05 ENCOUNTER — PATIENT MESSAGE (OUTPATIENT)
Dept: ADMINISTRATIVE | Facility: HOSPITAL | Age: 41
End: 2021-04-05

## 2021-05-04 ENCOUNTER — PATIENT MESSAGE (OUTPATIENT)
Dept: RESEARCH | Facility: HOSPITAL | Age: 41
End: 2021-05-04

## 2021-05-25 ENCOUNTER — OFFICE VISIT (OUTPATIENT)
Dept: INTERNAL MEDICINE | Facility: CLINIC | Age: 41
End: 2021-05-25
Payer: COMMERCIAL

## 2021-05-25 VITALS
HEART RATE: 94 BPM | DIASTOLIC BLOOD PRESSURE: 82 MMHG | WEIGHT: 180.56 LBS | HEIGHT: 70 IN | TEMPERATURE: 98 F | SYSTOLIC BLOOD PRESSURE: 116 MMHG | OXYGEN SATURATION: 97 % | BODY MASS INDEX: 25.85 KG/M2 | RESPIRATION RATE: 20 BRPM

## 2021-05-25 DIAGNOSIS — G43.809 OTHER MIGRAINE WITHOUT STATUS MIGRAINOSUS, NOT INTRACTABLE: Primary | ICD-10-CM

## 2021-05-25 DIAGNOSIS — K90.89 BILE SALT-INDUCED DIARRHEA: ICD-10-CM

## 2021-05-25 PROCEDURE — 99999 PR PBB SHADOW E&M-EST. PATIENT-LVL III: ICD-10-PCS | Mod: PBBFAC,,, | Performed by: NURSE PRACTITIONER

## 2021-05-25 PROCEDURE — 3008F BODY MASS INDEX DOCD: CPT | Mod: CPTII,S$GLB,, | Performed by: NURSE PRACTITIONER

## 2021-05-25 PROCEDURE — 1126F AMNT PAIN NOTED NONE PRSNT: CPT | Mod: S$GLB,,, | Performed by: NURSE PRACTITIONER

## 2021-05-25 PROCEDURE — 99214 PR OFFICE/OUTPT VISIT, EST, LEVL IV, 30-39 MIN: ICD-10-PCS | Mod: 25,S$GLB,, | Performed by: NURSE PRACTITIONER

## 2021-05-25 PROCEDURE — 3008F PR BODY MASS INDEX (BMI) DOCUMENTED: ICD-10-PCS | Mod: CPTII,S$GLB,, | Performed by: NURSE PRACTITIONER

## 2021-05-25 PROCEDURE — 1126F PR PAIN SEVERITY QUANTIFIED, NO PAIN PRESENT: ICD-10-PCS | Mod: S$GLB,,, | Performed by: NURSE PRACTITIONER

## 2021-05-25 PROCEDURE — 96372 PR INJECTION,THERAP/PROPH/DIAG2ST, IM OR SUBCUT: ICD-10-PCS | Mod: S$GLB,,, | Performed by: NURSE PRACTITIONER

## 2021-05-25 PROCEDURE — 99214 OFFICE O/P EST MOD 30 MIN: CPT | Mod: 25,S$GLB,, | Performed by: NURSE PRACTITIONER

## 2021-05-25 PROCEDURE — 99999 PR PBB SHADOW E&M-EST. PATIENT-LVL III: CPT | Mod: PBBFAC,,, | Performed by: NURSE PRACTITIONER

## 2021-05-25 PROCEDURE — 96372 THER/PROPH/DIAG INJ SC/IM: CPT | Mod: S$GLB,,, | Performed by: NURSE PRACTITIONER

## 2021-05-25 RX ORDER — KETOROLAC TROMETHAMINE 30 MG/ML
60 INJECTION, SOLUTION INTRAMUSCULAR; INTRAVENOUS
Status: COMPLETED | OUTPATIENT
Start: 2021-05-25 | End: 2021-05-25

## 2021-05-25 RX ORDER — METHYLPREDNISOLONE ACETATE 80 MG/ML
80 INJECTION, SUSPENSION INTRA-ARTICULAR; INTRALESIONAL; INTRAMUSCULAR; SOFT TISSUE
Status: COMPLETED | OUTPATIENT
Start: 2021-05-25 | End: 2021-05-25

## 2021-05-25 RX ORDER — MONTELUKAST SODIUM 4 MG/1
1 TABLET, CHEWABLE ORAL 2 TIMES DAILY
Qty: 60 TABLET | Refills: 5 | Status: SHIPPED | OUTPATIENT
Start: 2021-05-25 | End: 2022-11-11 | Stop reason: SDUPTHER

## 2021-05-25 RX ADMIN — KETOROLAC TROMETHAMINE 60 MG: 30 INJECTION, SOLUTION INTRAMUSCULAR; INTRAVENOUS at 03:05

## 2021-05-25 RX ADMIN — METHYLPREDNISOLONE ACETATE 80 MG: 80 INJECTION, SUSPENSION INTRA-ARTICULAR; INTRALESIONAL; INTRAMUSCULAR; SOFT TISSUE at 03:05

## 2021-08-20 ENCOUNTER — OFFICE VISIT (OUTPATIENT)
Dept: INTERNAL MEDICINE | Facility: CLINIC | Age: 41
End: 2021-08-20
Payer: COMMERCIAL

## 2021-08-20 VITALS
HEART RATE: 79 BPM | DIASTOLIC BLOOD PRESSURE: 88 MMHG | BODY MASS INDEX: 25.75 KG/M2 | HEIGHT: 70 IN | OXYGEN SATURATION: 96 % | SYSTOLIC BLOOD PRESSURE: 122 MMHG | WEIGHT: 179.88 LBS | RESPIRATION RATE: 20 BRPM

## 2021-08-20 DIAGNOSIS — K64.8 PROLAPSED INTERNAL HEMORRHOIDS: Primary | ICD-10-CM

## 2021-08-20 PROCEDURE — 3008F BODY MASS INDEX DOCD: CPT | Mod: CPTII,S$GLB,, | Performed by: NURSE PRACTITIONER

## 2021-08-20 PROCEDURE — 3074F PR MOST RECENT SYSTOLIC BLOOD PRESSURE < 130 MM HG: ICD-10-PCS | Mod: CPTII,S$GLB,, | Performed by: NURSE PRACTITIONER

## 2021-08-20 PROCEDURE — 1125F PR PAIN SEVERITY QUANTIFIED, PAIN PRESENT: ICD-10-PCS | Mod: CPTII,S$GLB,, | Performed by: NURSE PRACTITIONER

## 2021-08-20 PROCEDURE — 3079F PR MOST RECENT DIASTOLIC BLOOD PRESSURE 80-89 MM HG: ICD-10-PCS | Mod: CPTII,S$GLB,, | Performed by: NURSE PRACTITIONER

## 2021-08-20 PROCEDURE — 3008F PR BODY MASS INDEX (BMI) DOCUMENTED: ICD-10-PCS | Mod: CPTII,S$GLB,, | Performed by: NURSE PRACTITIONER

## 2021-08-20 PROCEDURE — 99999 PR PBB SHADOW E&M-EST. PATIENT-LVL IV: CPT | Mod: PBBFAC,,, | Performed by: NURSE PRACTITIONER

## 2021-08-20 PROCEDURE — 99999 PR PBB SHADOW E&M-EST. PATIENT-LVL IV: ICD-10-PCS | Mod: PBBFAC,,, | Performed by: NURSE PRACTITIONER

## 2021-08-20 PROCEDURE — 99213 OFFICE O/P EST LOW 20 MIN: CPT | Mod: S$GLB,,, | Performed by: NURSE PRACTITIONER

## 2021-08-20 PROCEDURE — 1125F AMNT PAIN NOTED PAIN PRSNT: CPT | Mod: CPTII,S$GLB,, | Performed by: NURSE PRACTITIONER

## 2021-08-20 PROCEDURE — 1160F RVW MEDS BY RX/DR IN RCRD: CPT | Mod: CPTII,S$GLB,, | Performed by: NURSE PRACTITIONER

## 2021-08-20 PROCEDURE — 1159F PR MEDICATION LIST DOCUMENTED IN MEDICAL RECORD: ICD-10-PCS | Mod: CPTII,S$GLB,, | Performed by: NURSE PRACTITIONER

## 2021-08-20 PROCEDURE — 3074F SYST BP LT 130 MM HG: CPT | Mod: CPTII,S$GLB,, | Performed by: NURSE PRACTITIONER

## 2021-08-20 PROCEDURE — 1160F PR REVIEW ALL MEDS BY PRESCRIBER/CLIN PHARMACIST DOCUMENTED: ICD-10-PCS | Mod: CPTII,S$GLB,, | Performed by: NURSE PRACTITIONER

## 2021-08-20 PROCEDURE — 3079F DIAST BP 80-89 MM HG: CPT | Mod: CPTII,S$GLB,, | Performed by: NURSE PRACTITIONER

## 2021-08-20 PROCEDURE — 99213 PR OFFICE/OUTPT VISIT, EST, LEVL III, 20-29 MIN: ICD-10-PCS | Mod: S$GLB,,, | Performed by: NURSE PRACTITIONER

## 2021-08-20 PROCEDURE — 1159F MED LIST DOCD IN RCRD: CPT | Mod: CPTII,S$GLB,, | Performed by: NURSE PRACTITIONER

## 2021-08-20 RX ORDER — HYDROCORTISONE ACETATE PRAMOXINE HCL 2.5; 1 G/100G; G/100G
CREAM TOPICAL 4 TIMES DAILY
Qty: 30 G | Refills: 2 | Status: SHIPPED | OUTPATIENT
Start: 2021-08-20 | End: 2022-02-03

## 2021-08-21 ENCOUNTER — HOSPITAL ENCOUNTER (EMERGENCY)
Facility: HOSPITAL | Age: 41
Discharge: HOME | End: 2021-08-21
Attending: EMERGENCY MEDICINE
Payer: COMMERCIAL

## 2021-08-21 VITALS
OXYGEN SATURATION: 96 % | TEMPERATURE: 99 F | DIASTOLIC BLOOD PRESSURE: 104 MMHG | HEART RATE: 71 BPM | SYSTOLIC BLOOD PRESSURE: 144 MMHG | BODY MASS INDEX: 26.19 KG/M2 | WEIGHT: 182.56 LBS | RESPIRATION RATE: 16 BRPM

## 2021-08-21 DIAGNOSIS — K64.8 OTHER HEMORRHOIDS: Primary | ICD-10-CM

## 2021-08-21 DIAGNOSIS — K64.5 EXTERNAL THROMBOSED HEMORRHOIDS: ICD-10-CM

## 2021-08-21 PROCEDURE — 99283 EMERGENCY DEPT VISIT LOW MDM: CPT

## 2021-08-21 PROCEDURE — 46083 INC THROMBOSED HROID XTRNL: CPT

## 2021-08-21 PROCEDURE — 25000003 PHARM REV CODE 250: Performed by: EMERGENCY MEDICINE

## 2021-08-21 RX ORDER — LIDOCAINE HYDROCHLORIDE 10 MG/ML
10 INJECTION, SOLUTION EPIDURAL; INFILTRATION; INTRACAUDAL; PERINEURAL
Status: COMPLETED | OUTPATIENT
Start: 2021-08-21 | End: 2021-08-21

## 2021-08-21 RX ORDER — HYDROCORTISONE 1 %
CREAM (GRAM) TOPICAL
Qty: 30 G | Refills: 0 | Status: SHIPPED | OUTPATIENT
Start: 2021-08-21 | End: 2022-02-03

## 2021-08-21 RX ADMIN — LIDOCAINE HYDROCHLORIDE 100 MG: 10 INJECTION, SOLUTION EPIDURAL; INFILTRATION; INTRACAUDAL; PERINEURAL at 05:08

## 2021-08-23 ENCOUNTER — OFFICE VISIT (OUTPATIENT)
Dept: FAMILY MEDICINE | Facility: CLINIC | Age: 41
End: 2021-08-23
Payer: COMMERCIAL

## 2021-08-23 VITALS
HEART RATE: 80 BPM | WEIGHT: 181.19 LBS | SYSTOLIC BLOOD PRESSURE: 130 MMHG | BODY MASS INDEX: 25.94 KG/M2 | HEIGHT: 70 IN | RESPIRATION RATE: 18 BRPM | DIASTOLIC BLOOD PRESSURE: 82 MMHG

## 2021-08-23 DIAGNOSIS — K64.5 EXTERNAL HEMORRHOID, THROMBOSED: Primary | ICD-10-CM

## 2021-08-23 PROCEDURE — 1125F AMNT PAIN NOTED PAIN PRSNT: CPT | Mod: CPTII,S$GLB,, | Performed by: FAMILY MEDICINE

## 2021-08-23 PROCEDURE — 3075F SYST BP GE 130 - 139MM HG: CPT | Mod: CPTII,S$GLB,, | Performed by: FAMILY MEDICINE

## 2021-08-23 PROCEDURE — 46083 PR INCISE EXTERNAL HEMORRHOID: ICD-10-PCS | Mod: S$GLB,,, | Performed by: FAMILY MEDICINE

## 2021-08-23 PROCEDURE — 3079F DIAST BP 80-89 MM HG: CPT | Mod: CPTII,S$GLB,, | Performed by: FAMILY MEDICINE

## 2021-08-23 PROCEDURE — 3075F PR MOST RECENT SYSTOLIC BLOOD PRESS GE 130-139MM HG: ICD-10-PCS | Mod: CPTII,S$GLB,, | Performed by: FAMILY MEDICINE

## 2021-08-23 PROCEDURE — 99213 PR OFFICE/OUTPT VISIT, EST, LEVL III, 20-29 MIN: ICD-10-PCS | Mod: 25,S$GLB,, | Performed by: FAMILY MEDICINE

## 2021-08-23 PROCEDURE — 1125F PR PAIN SEVERITY QUANTIFIED, PAIN PRESENT: ICD-10-PCS | Mod: CPTII,S$GLB,, | Performed by: FAMILY MEDICINE

## 2021-08-23 PROCEDURE — 1159F MED LIST DOCD IN RCRD: CPT | Mod: CPTII,S$GLB,, | Performed by: FAMILY MEDICINE

## 2021-08-23 PROCEDURE — 46083 INC THROMBOSED HROID XTRNL: CPT | Mod: S$GLB,,, | Performed by: FAMILY MEDICINE

## 2021-08-23 PROCEDURE — 3079F PR MOST RECENT DIASTOLIC BLOOD PRESSURE 80-89 MM HG: ICD-10-PCS | Mod: CPTII,S$GLB,, | Performed by: FAMILY MEDICINE

## 2021-08-23 PROCEDURE — 99999 PR PBB SHADOW E&M-EST. PATIENT-LVL III: CPT | Mod: PBBFAC,,, | Performed by: FAMILY MEDICINE

## 2021-08-23 PROCEDURE — 99999 PR PBB SHADOW E&M-EST. PATIENT-LVL III: ICD-10-PCS | Mod: PBBFAC,,, | Performed by: FAMILY MEDICINE

## 2021-08-23 PROCEDURE — 3008F PR BODY MASS INDEX (BMI) DOCUMENTED: ICD-10-PCS | Mod: CPTII,S$GLB,, | Performed by: FAMILY MEDICINE

## 2021-08-23 PROCEDURE — 3008F BODY MASS INDEX DOCD: CPT | Mod: CPTII,S$GLB,, | Performed by: FAMILY MEDICINE

## 2021-08-23 PROCEDURE — 99213 OFFICE O/P EST LOW 20 MIN: CPT | Mod: 25,S$GLB,, | Performed by: FAMILY MEDICINE

## 2021-08-23 PROCEDURE — 1159F PR MEDICATION LIST DOCUMENTED IN MEDICAL RECORD: ICD-10-PCS | Mod: CPTII,S$GLB,, | Performed by: FAMILY MEDICINE

## 2021-08-23 RX ORDER — HYDROCODONE BITARTRATE AND ACETAMINOPHEN 7.5; 325 MG/1; MG/1
1 TABLET ORAL EVERY 8 HOURS PRN
Qty: 10 TABLET | Refills: 0 | Status: SHIPPED | OUTPATIENT
Start: 2021-08-23 | End: 2022-02-03

## 2021-08-23 RX ORDER — HYDROCORTISONE ACETATE 25 MG/1
25 SUPPOSITORY RECTAL 2 TIMES DAILY
Qty: 20 SUPPOSITORY | Refills: 0 | Status: SHIPPED | OUTPATIENT
Start: 2021-08-23 | End: 2021-09-02

## 2022-02-03 ENCOUNTER — OFFICE VISIT (OUTPATIENT)
Dept: INTERNAL MEDICINE | Facility: CLINIC | Age: 42
End: 2022-02-03
Payer: COMMERCIAL

## 2022-02-03 ENCOUNTER — LAB VISIT (OUTPATIENT)
Dept: LAB | Facility: HOSPITAL | Age: 42
End: 2022-02-03
Attending: NURSE PRACTITIONER
Payer: COMMERCIAL

## 2022-02-03 VITALS
HEART RATE: 87 BPM | OXYGEN SATURATION: 99 % | HEIGHT: 70 IN | RESPIRATION RATE: 18 BRPM | DIASTOLIC BLOOD PRESSURE: 82 MMHG | SYSTOLIC BLOOD PRESSURE: 118 MMHG | WEIGHT: 180.56 LBS | BODY MASS INDEX: 25.85 KG/M2

## 2022-02-03 DIAGNOSIS — Z12.5 PROSTATE CANCER SCREENING: ICD-10-CM

## 2022-02-03 DIAGNOSIS — E78.2 MIXED HYPERLIPIDEMIA: ICD-10-CM

## 2022-02-03 DIAGNOSIS — R79.89 LOW TESTOSTERONE: ICD-10-CM

## 2022-02-03 LAB
ALBUMIN SERPL BCP-MCNC: 4.3 G/DL (ref 3.5–5.2)
ALP SERPL-CCNC: 69 U/L (ref 55–135)
ALT SERPL W/O P-5'-P-CCNC: 79 U/L (ref 10–44)
ANION GAP SERPL CALC-SCNC: 9 MMOL/L (ref 8–16)
AST SERPL-CCNC: 28 U/L (ref 10–40)
BASOPHILS # BLD AUTO: 0.03 K/UL (ref 0–0.2)
BASOPHILS NFR BLD: 0.4 % (ref 0–1.9)
BILIRUB SERPL-MCNC: 0.6 MG/DL (ref 0.1–1)
BUN SERPL-MCNC: 14 MG/DL (ref 6–20)
CALCIUM SERPL-MCNC: 9.7 MG/DL (ref 8.7–10.5)
CHLORIDE SERPL-SCNC: 106 MMOL/L (ref 95–110)
CHOLEST SERPL-MCNC: 211 MG/DL (ref 120–199)
CHOLEST/HDLC SERPL: 6.6 {RATIO} (ref 2–5)
CO2 SERPL-SCNC: 26 MMOL/L (ref 23–29)
COMPLEXED PSA SERPL-MCNC: 1.5 NG/ML (ref 0–4)
CREAT SERPL-MCNC: 1.1 MG/DL (ref 0.5–1.4)
DIFFERENTIAL METHOD: ABNORMAL
EOSINOPHIL # BLD AUTO: 0.2 K/UL (ref 0–0.5)
EOSINOPHIL NFR BLD: 2.9 % (ref 0–8)
ERYTHROCYTE [DISTWIDTH] IN BLOOD BY AUTOMATED COUNT: 11.7 % (ref 11.5–14.5)
EST. GFR  (AFRICAN AMERICAN): >60 ML/MIN/1.73 M^2
EST. GFR  (NON AFRICAN AMERICAN): >60 ML/MIN/1.73 M^2
GLUCOSE SERPL-MCNC: 96 MG/DL (ref 70–110)
HCT VFR BLD AUTO: 48.3 % (ref 40–54)
HDLC SERPL-MCNC: 32 MG/DL (ref 40–75)
HDLC SERPL: 15.2 % (ref 20–50)
HGB BLD-MCNC: 16.7 G/DL (ref 14–18)
IMM GRANULOCYTES # BLD AUTO: 0.02 K/UL (ref 0–0.04)
IMM GRANULOCYTES NFR BLD AUTO: 0.3 % (ref 0–0.5)
LDLC SERPL CALC-MCNC: 134.4 MG/DL (ref 63–159)
LYMPHOCYTES # BLD AUTO: 2.2 K/UL (ref 1–4.8)
LYMPHOCYTES NFR BLD: 30.3 % (ref 18–48)
MCH RBC QN AUTO: 31.1 PG (ref 27–31)
MCHC RBC AUTO-ENTMCNC: 34.6 G/DL (ref 32–36)
MCV RBC AUTO: 90 FL (ref 82–98)
MONOCYTES # BLD AUTO: 0.7 K/UL (ref 0.3–1)
MONOCYTES NFR BLD: 9 % (ref 4–15)
NEUTROPHILS # BLD AUTO: 4.1 K/UL (ref 1.8–7.7)
NEUTROPHILS NFR BLD: 57.1 % (ref 38–73)
NONHDLC SERPL-MCNC: 179 MG/DL
NRBC BLD-RTO: 0 /100 WBC
PLATELET # BLD AUTO: 344 K/UL (ref 150–450)
PMV BLD AUTO: 8.6 FL (ref 9.2–12.9)
POTASSIUM SERPL-SCNC: 4.3 MMOL/L (ref 3.5–5.1)
PROT SERPL-MCNC: 8 G/DL (ref 6–8.4)
RBC # BLD AUTO: 5.37 M/UL (ref 4.6–6.2)
SODIUM SERPL-SCNC: 141 MMOL/L (ref 136–145)
TRIGL SERPL-MCNC: 223 MG/DL (ref 30–150)
TSH SERPL DL<=0.005 MIU/L-ACNC: 0.5 UIU/ML (ref 0.4–4)
WBC # BLD AUTO: 7.25 K/UL (ref 3.9–12.7)

## 2022-02-03 PROCEDURE — 80053 COMPREHEN METABOLIC PANEL: CPT | Performed by: NURSE PRACTITIONER

## 2022-02-03 PROCEDURE — 84153 ASSAY OF PSA TOTAL: CPT | Performed by: NURSE PRACTITIONER

## 2022-02-03 PROCEDURE — 3074F PR MOST RECENT SYSTOLIC BLOOD PRESSURE < 130 MM HG: ICD-10-PCS | Mod: CPTII,S$GLB,, | Performed by: NURSE PRACTITIONER

## 2022-02-03 PROCEDURE — 99999 PR PBB SHADOW E&M-EST. PATIENT-LVL III: CPT | Mod: PBBFAC,,, | Performed by: NURSE PRACTITIONER

## 2022-02-03 PROCEDURE — 99214 OFFICE O/P EST MOD 30 MIN: CPT | Mod: S$GLB,,, | Performed by: NURSE PRACTITIONER

## 2022-02-03 PROCEDURE — 36415 COLL VENOUS BLD VENIPUNCTURE: CPT | Performed by: NURSE PRACTITIONER

## 2022-02-03 PROCEDURE — 99214 PR OFFICE/OUTPT VISIT, EST, LEVL IV, 30-39 MIN: ICD-10-PCS | Mod: S$GLB,,, | Performed by: NURSE PRACTITIONER

## 2022-02-03 PROCEDURE — 82040 ASSAY OF SERUM ALBUMIN: CPT | Performed by: NURSE PRACTITIONER

## 2022-02-03 PROCEDURE — 1159F PR MEDICATION LIST DOCUMENTED IN MEDICAL RECORD: ICD-10-PCS | Mod: CPTII,S$GLB,, | Performed by: NURSE PRACTITIONER

## 2022-02-03 PROCEDURE — 99999 PR PBB SHADOW E&M-EST. PATIENT-LVL III: ICD-10-PCS | Mod: PBBFAC,,, | Performed by: NURSE PRACTITIONER

## 2022-02-03 PROCEDURE — 3074F SYST BP LT 130 MM HG: CPT | Mod: CPTII,S$GLB,, | Performed by: NURSE PRACTITIONER

## 2022-02-03 PROCEDURE — 84443 ASSAY THYROID STIM HORMONE: CPT | Performed by: NURSE PRACTITIONER

## 2022-02-03 PROCEDURE — 85025 COMPLETE CBC W/AUTO DIFF WBC: CPT | Performed by: NURSE PRACTITIONER

## 2022-02-03 PROCEDURE — 3079F DIAST BP 80-89 MM HG: CPT | Mod: CPTII,S$GLB,, | Performed by: NURSE PRACTITIONER

## 2022-02-03 PROCEDURE — 3008F PR BODY MASS INDEX (BMI) DOCUMENTED: ICD-10-PCS | Mod: CPTII,S$GLB,, | Performed by: NURSE PRACTITIONER

## 2022-02-03 PROCEDURE — 3079F PR MOST RECENT DIASTOLIC BLOOD PRESSURE 80-89 MM HG: ICD-10-PCS | Mod: CPTII,S$GLB,, | Performed by: NURSE PRACTITIONER

## 2022-02-03 PROCEDURE — 1159F MED LIST DOCD IN RCRD: CPT | Mod: CPTII,S$GLB,, | Performed by: NURSE PRACTITIONER

## 2022-02-03 PROCEDURE — 80061 LIPID PANEL: CPT | Performed by: NURSE PRACTITIONER

## 2022-02-03 PROCEDURE — 3008F BODY MASS INDEX DOCD: CPT | Mod: CPTII,S$GLB,, | Performed by: NURSE PRACTITIONER

## 2022-02-03 RX ORDER — TESTOSTERONE CYPIONATE 1000 MG/10ML
100 INJECTION, SOLUTION INTRAMUSCULAR
Qty: 10 ML | Refills: 0 | Status: SHIPPED | OUTPATIENT
Start: 2022-02-03 | End: 2022-11-11 | Stop reason: SDUPTHER

## 2022-02-03 NOTE — PROGRESS NOTES
Subjective:       Patient ID: Hernando Roman is a 41 y.o. male.    Chief Complaint: Follow-up (Check up-)    Patient is known, to me and presents with   Chief Complaint   Patient presents with    Follow-up     Check up-   .  Denies chest pain and shortness of breath.  Patient presents with check up and had labs done through work and his testosterone was less than 300. He does have a hx of fatigue and no energy. He also would like further blood work. He has decided he would like to do the injections instead of the topical for testosterone replacement.   HPI  Review of Systems   Constitutional: Positive for fatigue. Negative for activity change, appetite change, chills, diaphoresis, fever and unexpected weight change.   HENT: Negative.  Negative for congestion, ear discharge, ear pain, facial swelling, hearing loss, nosebleeds, postnasal drip, rhinorrhea, sinus pressure, sneezing, sore throat, tinnitus, trouble swallowing and voice change.    Eyes: Negative.  Negative for photophobia, pain, discharge, redness, itching and visual disturbance.   Respiratory: Negative.  Negative for apnea, cough, choking, chest tightness, shortness of breath, wheezing and stridor.    Cardiovascular: Negative.  Negative for chest pain, palpitations and leg swelling.   Gastrointestinal: Negative for abdominal distention, abdominal pain, anal bleeding, blood in stool, constipation, diarrhea, nausea and vomiting.   Genitourinary: Negative.  Negative for difficulty urinating, dysuria, enuresis, flank pain, frequency, hematuria, penile discharge, penile pain, penile swelling, scrotal swelling, testicular pain and urgency.   Musculoskeletal: Negative.  Negative for arthralgias, back pain, gait problem, joint swelling, myalgias, neck pain and neck stiffness.   Skin: Negative.  Negative for color change, pallor, rash and wound.   Neurological: Negative for dizziness, tremors, seizures, syncope, facial asymmetry, speech difficulty, weakness,  light-headedness, numbness and headaches.   Hematological: Negative for adenopathy. Does not bruise/bleed easily.   Psychiatric/Behavioral: Negative.  Negative for agitation, dysphoric mood, sleep disturbance and suicidal ideas. The patient is not nervous/anxious.        Objective:      Physical Exam  Vitals and nursing note reviewed.   Constitutional:       General: He is not in acute distress.     Appearance: He is well-developed and well-nourished. He is not diaphoretic.   HENT:      Head: Normocephalic and atraumatic.      Right Ear: External ear normal.      Left Ear: External ear normal.      Nose: Nose normal.      Mouth/Throat:      Mouth: Oropharynx is clear and moist.      Pharynx: No oropharyngeal exudate.   Eyes:      General:         Right eye: No discharge.         Left eye: No discharge.      Extraocular Movements: EOM normal.      Conjunctiva/sclera: Conjunctivae normal.      Pupils: Pupils are equal, round, and reactive to light.   Neck:      Thyroid: No thyromegaly.      Vascular: No JVD.      Trachea: No tracheal deviation.   Cardiovascular:      Rate and Rhythm: Normal rate and regular rhythm.      Pulses: Intact distal pulses.      Heart sounds: Normal heart sounds. No murmur heard.  No friction rub. No gallop.    Pulmonary:      Effort: Pulmonary effort is normal. No respiratory distress.      Breath sounds: Normal breath sounds. No stridor. No wheezing or rales.   Chest:      Chest wall: No tenderness.   Abdominal:      General: Bowel sounds are normal. There is no distension.      Palpations: Abdomen is soft.      Tenderness: There is no abdominal tenderness. There is no guarding or rebound.   Musculoskeletal:         General: No swelling, tenderness, deformity, signs of injury or edema. Normal range of motion.      Cervical back: Normal range of motion and neck supple.      Right lower leg: No edema.      Left lower leg: No edema.   Lymphadenopathy:      Cervical: No cervical adenopathy.  "  Skin:     General: Skin is warm and dry.      Capillary Refill: Capillary refill takes less than 2 seconds.      Coloration: Skin is not jaundiced or pale.      Findings: No bruising, erythema, lesion or rash.   Neurological:      General: No focal deficit present.      Mental Status: He is alert and oriented to person, place, and time.      Cranial Nerves: No cranial nerve deficit.      Sensory: No sensory deficit.      Motor: No weakness or abnormal muscle tone.      Coordination: Coordination normal.      Gait: Gait normal.      Deep Tendon Reflexes: Reflexes are normal and symmetric. Reflexes normal.   Psychiatric:         Mood and Affect: Mood and affect and mood normal.         Behavior: Behavior normal.         Thought Content: Thought content normal.         Judgment: Judgment normal.         Assessment:       1. Low testosterone    2. Mixed hyperlipidemia    3. Prostate cancer screening        Plan:   Hernando was seen today for follow-up.    Diagnoses and all orders for this visit:    Low testosterone  -     CBC Auto Differential; Future  -     Comprehensive Metabolic Panel; Future  -     testosterone cypionate (DEPOTESTOTERONE CYPIONATE) 100 mg/mL injection; Inject 1 mL (100 mg total) into the muscle every 14 (fourteen) days.  -     syringe-needle,safety,disp unt 3 mL 20 gauge x 1 1/2" Syrg; 1 Syringe by Misc.(Non-Drug; Combo Route) route every 14 (fourteen) days.  -     Testosterone Panel; Future    Mixed hyperlipidemia  -     CBC Auto Differential; Future  -     Comprehensive Metabolic Panel; Future  -     TSH; Future  -     Lipid Panel; Future    Prostate cancer screening  -     PSA, Screening; Future    "This note will not be shared with the patient."  Instructed on testosterone tx   Lab drawn today CBC, CMP, TSH, FLP  Limit the cholesterol in your diet to less than 300 mg per day.  Fats should contribute no more than 20 to 35% of your daily calories.  Less than 7 to 10% of your calories should come " from saturated fat.  Avoid saturated fat products e.g., butter, some oils, meat, and poultry fat contain a lot of saturated fat.  Check food labels for fat and cholesterol content. Choose the foods with less fat per serving.  Limit the amount of butter and margarine you eat.  Use salad dressings and margarine made with polyunsaturated and monunsaturated fats.  Use egg whites or egg substitutes rather than whole eggs.  Replace whole-milk dairy products with nonfat or low-fat mild, cheese, spreads, and yogurt.  Eat skinless chicken, turkey, fish, and meatless entrees more often than red meat.  Choose lean cuts of meat and trim off all visible fat. Keep portion sizes moderate.  Avoid fatty desserts such as ice cream, cream-filled cakes, and cheesecakes. Choose fresh fruits, nonfat frozen yogurt, Popsicles, etc.  Reduce the amount of fried foods, vending machine food, and fast food you eat.  Eat fruits and vegetables (especially fresh fruits and leafy vegetables), beans, and whole grains daily. The fiber in these foods helps lower cholesterol.  Look for low-fat or nonfat varieties of the foods you like to eat or look for substitutes.  You may need to exercise 60 minutes a day to prevent weight gain and 90 minutes a day to lose weight.  RTC in six months.

## 2022-02-03 NOTE — PATIENT INSTRUCTIONS
"Patient Education       Low Testosterone in Men   The Basics   Written by the doctors and editors at [facility]   What is testosterone? -- Testosterone is one of a few different "male hormones" or what doctors call "androgens." Testosterone is the most important androgen that men make. It helps with muscle strength, sex drive, and bone strength.  What is low testosterone in men? -- Low testosterone is when the body makes too little testosterone. The medical term for this is "androgen deficiency," but many people call it just "low T." As men get older, it is normal for levels of testosterone to get a bit lower. But when the levels of testosterone get too low, men can have symptoms.  What are the symptoms of low T? -- The symptoms are different depending on how long a man has had low T.  At first, men with low T:  · Feel tired, especially at the end of the day  · Have little or no interest in sex (also called "low libido")  · Feel sad, down, or depressed  After a year or more of having low T, men develop other symptoms or medical problems, including:  · Loss of muscle  · Loss of bone strength  · Loss of facial or body hair  · Growing breasts (also called "gynecomastia")  What causes low T in men? -- Low T in men can be caused by:  · Normal aging  · Injury or infection affecting the testicles (which make most of the testosterone in men)  · Certain treatments for cancer, including radiation, chemotherapy, and hormone therapy for prostate cancer  · Disorders that affect the pituitary gland, a gland at the base of the brain that controls all hormone-producing organs  · Other medical problems, such as liver and kidney disease, obesity, diabetes, and AIDS  · Other rare genetic problems  Should I see a doctor or nurse? -- Yes, if you have symptoms of low T, see your doctor or nurse. There are many things that can cause those symptoms. Your doctor or nurse can try to find out what might be causing them. A blood test can show " whether you have low T, but you might not need that test if something else is causing your symptoms.  How is low T in men treated? -- Low T can be treated with testosterone replacement. Testosterone comes in different forms, including a shot, gel, or capsule (table 1). But low T is not always treated, especially in men older than 60. That's because it's normal for testosterone to drop a bit in all men as they age. In fact, normal aging causes some of the same changes that happen in men with low T, such as less energy or interest in sex.  Experts are worried that testosterone treatments might increase the risk of heart attacks and strokes in some men. This has not been proven. But doctors are careful about when to suggest testosterone, and which forms to suggest, especially in older men.  Men older than 60 might have testosterone replacement if:  · At least 2 or 3 blood tests show very low T  · They have symptoms of low T that bother them  · The symptoms are not caused by another disease or condition that doctors can treat  · They have not had a heart attack or stroke  Older men who have testosterone replacement need regular testing. This includes tests to measure blood testosterone levels and regular screening tests for prostate cancer.  All topics are updated as new evidence becomes available and our peer review process is complete.  This topic retrieved from [employer] on: Sep 21, 2021.  Topic 16802 Version 7.0  Release: 29.4.2 - C29.263  © 2021 UpToDate, Inc. and/or its affiliates. All rights reserved.  table 1: Commonly prescribed forms of testosterone  Brand name in [place]  How it's given  How often it's used  Comments    Delatestryl (testosterone enanthate)  Depo-Testosterone (testosterone cypionate)  Injection (shot) Every 1 to 2 weeks Shot can be given at home. Hormone levels can go up and down between shots. May cost less than other testosterone medicines.   AndroGel Gel put on the upper arm, shoulder, or belly  "(only put 1% strength on belly, not 1.62%) Every morning Comes in different strengths. Pump bottle gives exact dose.   Testim Gel put on the upper arm or shoulder Every morning Comes in 1-dose tubes. Some people notice a smell.   Fortesta Gel put on thighs Every morning Pump bottle gives exact dose.   Axiron Liquid put on armpits Every morning Pump bottle with applicator gives exact dose.   Testopel Tiny pellets that are implanted under skin of the butt, belly wall, or thigh Every 3 to 6 months Implant is inserted through a cut on the skin under local anesthesia. It can only be done at certain doctor's offices or hospitals. Area where implant is given can become red and painful.   Aveed (testosterone undecanoate) Injection (shot) in butt Every 10 weeks (but second shot is done at 4 weeks) Shot is given only in certain doctor's offices or hospitals. Rare cases of lung problems or allergic reactions have happened soon after the shot was given.   Testosterone undecanoate (Jatenzo) By mouth (in a capsule) Twice a day, after breakfast and dinner Might increase blood pressure and the risk of heart attack or stroke. Not used in older men with low testosterone related to aging.   A man might need androgen replacement if he has low levels of an androgen called "testosterone." There are several different ones, but testosterone is the main androgen. There are different ways to give testosterone to men who need androgen replacement. Each brand is used a different way. Your doctor or nurse can give you the directions for the brand you use.  Graphic 65827 Version 8.0  Consumer Information Use and Disclaimer   This information is not specific medical advice and does not replace information you receive from your health care provider. This is only a brief summary of general information. It does NOT include all information about conditions, illnesses, injuries, tests, procedures, treatments, therapies, discharge instructions or " life-style choices that may apply to you. You must talk with your health care provider for complete information about your health and treatment options. This information should not be used to decide whether or not to accept your health care provider's advice, instructions or recommendations. Only your health care provider has the knowledge and training to provide advice that is right for you. The use of this information is governed by the [employer] User License Agreement, available at https://www.[employer]/en/solutions/[employer]/about/vijay.The use of [employer] content is governed by the [employer] Terms of Use. ©2021 [employer] All rights reserved.  Copyright   © 2021 [employer] and/or its affiliates. All rights reserved.

## 2022-02-10 LAB
ALBUMIN SERPL-MCNC: 4.7 G/DL (ref 3.6–5.1)
SHBG SERPL-SCNC: 9 NMOL/L (ref 10–50)
TESTOST FREE SERPL-MCNC: 108.7 PG/ML (ref 46–224)
TESTOST SERPL-MCNC: 375 NG/DL (ref 250–1100)
TESTOSTERONE.FREE+WB SERPL-MCNC: 232.9 NG/DL (ref 110–575)

## 2022-02-16 ENCOUNTER — PATIENT MESSAGE (OUTPATIENT)
Dept: INTERNAL MEDICINE | Facility: CLINIC | Age: 42
End: 2022-02-16
Payer: COMMERCIAL

## 2022-05-16 ENCOUNTER — OFFICE VISIT (OUTPATIENT)
Dept: INTERNAL MEDICINE | Facility: CLINIC | Age: 42
End: 2022-05-16
Payer: COMMERCIAL

## 2022-05-16 ENCOUNTER — LAB VISIT (OUTPATIENT)
Dept: LAB | Facility: HOSPITAL | Age: 42
End: 2022-05-16
Attending: NURSE PRACTITIONER
Payer: COMMERCIAL

## 2022-05-16 VITALS
DIASTOLIC BLOOD PRESSURE: 84 MMHG | BODY MASS INDEX: 26.32 KG/M2 | OXYGEN SATURATION: 99 % | RESPIRATION RATE: 18 BRPM | HEART RATE: 84 BPM | HEIGHT: 69 IN | SYSTOLIC BLOOD PRESSURE: 122 MMHG | WEIGHT: 177.69 LBS

## 2022-05-16 DIAGNOSIS — R79.89 LOW TESTOSTERONE IN MALE: Primary | ICD-10-CM

## 2022-05-16 DIAGNOSIS — R79.89 LOW TESTOSTERONE IN MALE: ICD-10-CM

## 2022-05-16 LAB
BASOPHILS # BLD AUTO: 0.04 K/UL (ref 0–0.2)
BASOPHILS NFR BLD: 0.6 % (ref 0–1.9)
DIFFERENTIAL METHOD: ABNORMAL
EOSINOPHIL # BLD AUTO: 0.1 K/UL (ref 0–0.5)
EOSINOPHIL NFR BLD: 1.9 % (ref 0–8)
ERYTHROCYTE [DISTWIDTH] IN BLOOD BY AUTOMATED COUNT: 11.9 % (ref 11.5–14.5)
HCT VFR BLD AUTO: 45.8 % (ref 40–54)
HGB BLD-MCNC: 15.6 G/DL (ref 14–18)
IMM GRANULOCYTES # BLD AUTO: 0.02 K/UL (ref 0–0.04)
IMM GRANULOCYTES NFR BLD AUTO: 0.3 % (ref 0–0.5)
LYMPHOCYTES # BLD AUTO: 2.1 K/UL (ref 1–4.8)
LYMPHOCYTES NFR BLD: 33.3 % (ref 18–48)
MCH RBC QN AUTO: 30.2 PG (ref 27–31)
MCHC RBC AUTO-ENTMCNC: 34.1 G/DL (ref 32–36)
MCV RBC AUTO: 89 FL (ref 82–98)
MONOCYTES # BLD AUTO: 0.6 K/UL (ref 0.3–1)
MONOCYTES NFR BLD: 9.5 % (ref 4–15)
NEUTROPHILS # BLD AUTO: 3.5 K/UL (ref 1.8–7.7)
NEUTROPHILS NFR BLD: 54.4 % (ref 38–73)
NRBC BLD-RTO: 0 /100 WBC
PLATELET # BLD AUTO: 317 K/UL (ref 150–450)
PMV BLD AUTO: 9.1 FL (ref 9.2–12.9)
RBC # BLD AUTO: 5.17 M/UL (ref 4.6–6.2)
WBC # BLD AUTO: 6.34 K/UL (ref 3.9–12.7)

## 2022-05-16 PROCEDURE — 99214 PR OFFICE/OUTPT VISIT, EST, LEVL IV, 30-39 MIN: ICD-10-PCS | Mod: S$GLB,,, | Performed by: NURSE PRACTITIONER

## 2022-05-16 PROCEDURE — 82728 ASSAY OF FERRITIN: CPT | Performed by: NURSE PRACTITIONER

## 2022-05-16 PROCEDURE — 3074F SYST BP LT 130 MM HG: CPT | Mod: CPTII,S$GLB,, | Performed by: NURSE PRACTITIONER

## 2022-05-16 PROCEDURE — 3008F BODY MASS INDEX DOCD: CPT | Mod: CPTII,S$GLB,, | Performed by: NURSE PRACTITIONER

## 2022-05-16 PROCEDURE — 84466 ASSAY OF TRANSFERRIN: CPT | Performed by: NURSE PRACTITIONER

## 2022-05-16 PROCEDURE — 1159F PR MEDICATION LIST DOCUMENTED IN MEDICAL RECORD: ICD-10-PCS | Mod: CPTII,S$GLB,, | Performed by: NURSE PRACTITIONER

## 2022-05-16 PROCEDURE — 3008F PR BODY MASS INDEX (BMI) DOCUMENTED: ICD-10-PCS | Mod: CPTII,S$GLB,, | Performed by: NURSE PRACTITIONER

## 2022-05-16 PROCEDURE — 3079F PR MOST RECENT DIASTOLIC BLOOD PRESSURE 80-89 MM HG: ICD-10-PCS | Mod: CPTII,S$GLB,, | Performed by: NURSE PRACTITIONER

## 2022-05-16 PROCEDURE — 82040 ASSAY OF SERUM ALBUMIN: CPT | Performed by: NURSE PRACTITIONER

## 2022-05-16 PROCEDURE — 3079F DIAST BP 80-89 MM HG: CPT | Mod: CPTII,S$GLB,, | Performed by: NURSE PRACTITIONER

## 2022-05-16 PROCEDURE — 85025 COMPLETE CBC W/AUTO DIFF WBC: CPT | Performed by: NURSE PRACTITIONER

## 2022-05-16 PROCEDURE — 99999 PR PBB SHADOW E&M-EST. PATIENT-LVL III: ICD-10-PCS | Mod: PBBFAC,,, | Performed by: NURSE PRACTITIONER

## 2022-05-16 PROCEDURE — 36415 COLL VENOUS BLD VENIPUNCTURE: CPT | Performed by: NURSE PRACTITIONER

## 2022-05-16 PROCEDURE — 1159F MED LIST DOCD IN RCRD: CPT | Mod: CPTII,S$GLB,, | Performed by: NURSE PRACTITIONER

## 2022-05-16 PROCEDURE — 99214 OFFICE O/P EST MOD 30 MIN: CPT | Mod: S$GLB,,, | Performed by: NURSE PRACTITIONER

## 2022-05-16 PROCEDURE — 3074F PR MOST RECENT SYSTOLIC BLOOD PRESSURE < 130 MM HG: ICD-10-PCS | Mod: CPTII,S$GLB,, | Performed by: NURSE PRACTITIONER

## 2022-05-16 PROCEDURE — 99999 PR PBB SHADOW E&M-EST. PATIENT-LVL III: CPT | Mod: PBBFAC,,, | Performed by: NURSE PRACTITIONER

## 2022-05-16 NOTE — PROGRESS NOTES
Subjective:       Patient ID: Hernando Roman is a 41 y.o. male.    Chief Complaint: Follow-up (Since testosterone treatment )    Patient is known, to me and presents with   Chief Complaint   Patient presents with    Follow-up     Since testosterone treatment    .  Denies chest pain and shortness of breath.  Patient presents with follow up testosterone treatment and feels that he is not on a high enough dose. Needs to have blood work States that when he takes the injection he feels good for a few days then right back with fatigue    HPI  Review of Systems   Constitutional: Negative.  Negative for activity change, appetite change, chills, diaphoresis, fatigue, fever and unexpected weight change.   HENT: Negative.  Negative for congestion, ear discharge, ear pain, facial swelling, hearing loss, nosebleeds, postnasal drip, rhinorrhea, sinus pressure, sneezing, sore throat, tinnitus, trouble swallowing and voice change.    Eyes: Negative.  Negative for photophobia, pain, discharge, redness, itching and visual disturbance.   Respiratory: Negative.  Negative for apnea, cough, choking, chest tightness, shortness of breath, wheezing and stridor.    Cardiovascular: Negative.  Negative for chest pain, palpitations and leg swelling.   Gastrointestinal: Negative for abdominal distention, abdominal pain, anal bleeding, blood in stool, constipation, diarrhea, nausea and vomiting.   Genitourinary: Negative.  Negative for difficulty urinating, dysuria, enuresis, flank pain, frequency, hematuria, penile discharge, penile pain, penile swelling, scrotal swelling, testicular pain and urgency.   Musculoskeletal: Negative.  Negative for arthralgias, back pain, gait problem, joint swelling, myalgias, neck pain and neck stiffness.   Skin: Negative.  Negative for color change, pallor, rash and wound.   Neurological: Negative for dizziness, tremors, seizures, syncope, facial asymmetry, speech difficulty, weakness, light-headedness, numbness  and headaches.   Hematological: Negative for adenopathy. Does not bruise/bleed easily.   Psychiatric/Behavioral: Negative.  Negative for agitation, dysphoric mood, sleep disturbance and suicidal ideas. The patient is not nervous/anxious.        Objective:      Physical Exam  Vitals and nursing note reviewed.   Constitutional:       General: He is not in acute distress.     Appearance: He is well-developed. He is not diaphoretic.   HENT:      Head: Normocephalic and atraumatic.      Right Ear: External ear normal.      Left Ear: External ear normal.      Nose: Nose normal.      Mouth/Throat:      Pharynx: No oropharyngeal exudate.   Eyes:      General:         Right eye: No discharge.         Left eye: No discharge.      Conjunctiva/sclera: Conjunctivae normal.      Pupils: Pupils are equal, round, and reactive to light.   Neck:      Thyroid: No thyromegaly.      Vascular: No JVD.      Trachea: No tracheal deviation.   Cardiovascular:      Rate and Rhythm: Normal rate and regular rhythm.      Heart sounds: Normal heart sounds. No murmur heard.    No friction rub. No gallop.   Pulmonary:      Effort: Pulmonary effort is normal. No respiratory distress.      Breath sounds: Normal breath sounds. No stridor. No wheezing or rales.   Chest:      Chest wall: No tenderness.   Abdominal:      General: Bowel sounds are normal. There is no distension.      Palpations: Abdomen is soft.      Tenderness: There is no abdominal tenderness. There is no guarding or rebound.   Musculoskeletal:         General: No tenderness. Normal range of motion.      Cervical back: Normal range of motion and neck supple.   Lymphadenopathy:      Cervical: No cervical adenopathy.   Skin:     General: Skin is warm and dry.      Capillary Refill: Capillary refill takes less than 2 seconds.      Coloration: Skin is not pale.      Findings: No erythema or rash.   Neurological:      Mental Status: He is alert and oriented to person, place, and time.       "Cranial Nerves: No cranial nerve deficit.      Motor: No abnormal muscle tone.      Coordination: Coordination normal.      Deep Tendon Reflexes: Reflexes are normal and symmetric. Reflexes normal.   Psychiatric:         Mood and Affect: Mood normal.         Behavior: Behavior normal.         Thought Content: Thought content normal.         Judgment: Judgment normal.         Assessment:       1. Low testosterone in male        Plan:   Hernando was seen today for follow-up.    Diagnoses and all orders for this visit:    Low testosterone in male  -     CBC Auto Differential; Future  -     Iron and TIBC; Future  -     Ferritin; Future  -     TESTOSTERONE PANEL; Future    "This note will not be shared with the patient."  Will call with results and make necessary changes  rtc as scheduled  "

## 2022-05-17 LAB
FERRITIN SERPL-MCNC: 567 NG/ML (ref 20–300)
IRON SERPL-MCNC: 125 UG/DL (ref 45–160)
SATURATED IRON: 33 % (ref 20–50)
TOTAL IRON BINDING CAPACITY: 377 UG/DL (ref 250–450)
TRANSFERRIN SERPL-MCNC: 255 MG/DL (ref 200–375)

## 2022-05-19 ENCOUNTER — PATIENT MESSAGE (OUTPATIENT)
Dept: INTERNAL MEDICINE | Facility: CLINIC | Age: 42
End: 2022-05-19
Payer: COMMERCIAL

## 2022-05-23 LAB
ALBUMIN SERPL-MCNC: 4.6 G/DL (ref 3.6–5.1)
SHBG SERPL-SCNC: 11 NMOL/L (ref 10–50)
TESTOST FREE SERPL-MCNC: 256.8 PG/ML (ref 46–224)
TESTOST SERPL-MCNC: 838 NG/DL (ref 250–1100)
TESTOSTERONE.FREE+WB SERPL-MCNC: 539.2 NG/DL (ref 110–575)

## 2022-05-24 ENCOUNTER — PATIENT MESSAGE (OUTPATIENT)
Dept: INTERNAL MEDICINE | Facility: CLINIC | Age: 42
End: 2022-05-24
Payer: COMMERCIAL

## 2022-05-24 DIAGNOSIS — R74.8 ELEVATED LIVER ENZYMES: Primary | ICD-10-CM

## 2022-05-24 DIAGNOSIS — N52.9 ERECTILE DYSFUNCTION, UNSPECIFIED ERECTILE DYSFUNCTION TYPE: ICD-10-CM

## 2022-05-24 RX ORDER — SILDENAFIL 100 MG/1
100 TABLET, FILM COATED ORAL DAILY PRN
Qty: 10 TABLET | Refills: 0 | Status: SHIPPED | OUTPATIENT
Start: 2022-05-24 | End: 2022-11-11 | Stop reason: SDUPTHER

## 2022-05-27 ENCOUNTER — HOSPITAL ENCOUNTER (OUTPATIENT)
Dept: RADIOLOGY | Facility: HOSPITAL | Age: 42
Discharge: HOME | End: 2022-05-27
Attending: NURSE PRACTITIONER
Payer: COMMERCIAL

## 2022-05-27 DIAGNOSIS — R74.8 ELEVATED LIVER ENZYMES: ICD-10-CM

## 2022-05-27 PROCEDURE — 76705 US ABDOMEN LIMITED: ICD-10-PCS | Mod: 26,,, | Performed by: RADIOLOGY

## 2022-05-27 PROCEDURE — 76705 ECHO EXAM OF ABDOMEN: CPT | Mod: 26,,, | Performed by: RADIOLOGY

## 2022-05-27 PROCEDURE — 76705 ECHO EXAM OF ABDOMEN: CPT | Mod: TC

## 2022-06-01 ENCOUNTER — PATIENT MESSAGE (OUTPATIENT)
Dept: INTERNAL MEDICINE | Facility: CLINIC | Age: 42
End: 2022-06-01
Payer: COMMERCIAL

## 2022-11-10 ENCOUNTER — OFFICE VISIT (OUTPATIENT)
Dept: INTERNAL MEDICINE | Facility: CLINIC | Age: 42
End: 2022-11-10
Payer: COMMERCIAL

## 2022-11-10 ENCOUNTER — LAB VISIT (OUTPATIENT)
Dept: LAB | Facility: HOSPITAL | Age: 42
End: 2022-11-10
Attending: NURSE PRACTITIONER
Payer: COMMERCIAL

## 2022-11-10 VITALS
HEIGHT: 69 IN | BODY MASS INDEX: 25.8 KG/M2 | HEART RATE: 78 BPM | DIASTOLIC BLOOD PRESSURE: 78 MMHG | RESPIRATION RATE: 14 BRPM | OXYGEN SATURATION: 98 % | WEIGHT: 174.19 LBS | SYSTOLIC BLOOD PRESSURE: 124 MMHG

## 2022-11-10 DIAGNOSIS — R79.89 LOW TESTOSTERONE IN MALE: ICD-10-CM

## 2022-11-10 DIAGNOSIS — E78.2 MIXED HYPERLIPIDEMIA: ICD-10-CM

## 2022-11-10 DIAGNOSIS — E78.2 MIXED HYPERLIPIDEMIA: Primary | ICD-10-CM

## 2022-11-10 LAB
ALBUMIN SERPL BCP-MCNC: 4.4 G/DL (ref 3.5–5.2)
ALP SERPL-CCNC: 59 U/L (ref 55–135)
ALT SERPL W/O P-5'-P-CCNC: 91 U/L (ref 10–44)
ANION GAP SERPL CALC-SCNC: 11 MMOL/L (ref 8–16)
AST SERPL-CCNC: 41 U/L (ref 10–40)
BASOPHILS # BLD AUTO: 0.05 K/UL (ref 0–0.2)
BASOPHILS NFR BLD: 0.7 % (ref 0–1.9)
BILIRUB SERPL-MCNC: 0.9 MG/DL (ref 0.1–1)
BUN SERPL-MCNC: 14 MG/DL (ref 6–20)
CALCIUM SERPL-MCNC: 9.5 MG/DL (ref 8.7–10.5)
CHLORIDE SERPL-SCNC: 104 MMOL/L (ref 95–110)
CHOLEST SERPL-MCNC: 274 MG/DL (ref 120–199)
CHOLEST/HDLC SERPL: 7.2 {RATIO} (ref 2–5)
CO2 SERPL-SCNC: 25 MMOL/L (ref 23–29)
CREAT SERPL-MCNC: 1 MG/DL (ref 0.5–1.4)
DIFFERENTIAL METHOD: ABNORMAL
EOSINOPHIL # BLD AUTO: 0.2 K/UL (ref 0–0.5)
EOSINOPHIL NFR BLD: 2.6 % (ref 0–8)
ERYTHROCYTE [DISTWIDTH] IN BLOOD BY AUTOMATED COUNT: 11.8 % (ref 11.5–14.5)
EST. GFR  (NO RACE VARIABLE): >60 ML/MIN/1.73 M^2
GLUCOSE SERPL-MCNC: 89 MG/DL (ref 70–110)
HCT VFR BLD AUTO: 47.3 % (ref 40–54)
HDLC SERPL-MCNC: 38 MG/DL (ref 40–75)
HDLC SERPL: 13.9 % (ref 20–50)
HGB BLD-MCNC: 16.6 G/DL (ref 14–18)
IMM GRANULOCYTES # BLD AUTO: 0.02 K/UL (ref 0–0.04)
IMM GRANULOCYTES NFR BLD AUTO: 0.3 % (ref 0–0.5)
LDLC SERPL CALC-MCNC: 191.2 MG/DL (ref 63–159)
LYMPHOCYTES # BLD AUTO: 2.2 K/UL (ref 1–4.8)
LYMPHOCYTES NFR BLD: 29.1 % (ref 18–48)
MCH RBC QN AUTO: 30.6 PG (ref 27–31)
MCHC RBC AUTO-ENTMCNC: 35.1 G/DL (ref 32–36)
MCV RBC AUTO: 87 FL (ref 82–98)
MONOCYTES # BLD AUTO: 0.6 K/UL (ref 0.3–1)
MONOCYTES NFR BLD: 7.7 % (ref 4–15)
NEUTROPHILS # BLD AUTO: 4.6 K/UL (ref 1.8–7.7)
NEUTROPHILS NFR BLD: 59.6 % (ref 38–73)
NONHDLC SERPL-MCNC: 236 MG/DL
NRBC BLD-RTO: 0 /100 WBC
PLATELET # BLD AUTO: 326 K/UL (ref 150–450)
PMV BLD AUTO: 9.1 FL (ref 9.2–12.9)
POTASSIUM SERPL-SCNC: 4.2 MMOL/L (ref 3.5–5.1)
PROT SERPL-MCNC: 7.8 G/DL (ref 6–8.4)
RBC # BLD AUTO: 5.42 M/UL (ref 4.6–6.2)
SODIUM SERPL-SCNC: 140 MMOL/L (ref 136–145)
TRIGL SERPL-MCNC: 224 MG/DL (ref 30–150)
TSH SERPL DL<=0.005 MIU/L-ACNC: 0.43 UIU/ML (ref 0.4–4)
WBC # BLD AUTO: 7.64 K/UL (ref 3.9–12.7)

## 2022-11-10 PROCEDURE — 36415 COLL VENOUS BLD VENIPUNCTURE: CPT | Performed by: NURSE PRACTITIONER

## 2022-11-10 PROCEDURE — 80061 LIPID PANEL: CPT | Performed by: NURSE PRACTITIONER

## 2022-11-10 PROCEDURE — 3074F SYST BP LT 130 MM HG: CPT | Mod: CPTII,S$GLB,, | Performed by: NURSE PRACTITIONER

## 2022-11-10 PROCEDURE — 99999 PR PBB SHADOW E&M-EST. PATIENT-LVL III: ICD-10-PCS | Mod: PBBFAC,,, | Performed by: NURSE PRACTITIONER

## 2022-11-10 PROCEDURE — 3008F PR BODY MASS INDEX (BMI) DOCUMENTED: ICD-10-PCS | Mod: CPTII,S$GLB,, | Performed by: NURSE PRACTITIONER

## 2022-11-10 PROCEDURE — 99999 PR PBB SHADOW E&M-EST. PATIENT-LVL III: CPT | Mod: PBBFAC,,, | Performed by: NURSE PRACTITIONER

## 2022-11-10 PROCEDURE — 1159F PR MEDICATION LIST DOCUMENTED IN MEDICAL RECORD: ICD-10-PCS | Mod: CPTII,S$GLB,, | Performed by: NURSE PRACTITIONER

## 2022-11-10 PROCEDURE — 84443 ASSAY THYROID STIM HORMONE: CPT | Performed by: NURSE PRACTITIONER

## 2022-11-10 PROCEDURE — 3078F DIAST BP <80 MM HG: CPT | Mod: CPTII,S$GLB,, | Performed by: NURSE PRACTITIONER

## 2022-11-10 PROCEDURE — 82040 ASSAY OF SERUM ALBUMIN: CPT | Performed by: NURSE PRACTITIONER

## 2022-11-10 PROCEDURE — 84466 ASSAY OF TRANSFERRIN: CPT | Performed by: NURSE PRACTITIONER

## 2022-11-10 PROCEDURE — 82728 ASSAY OF FERRITIN: CPT | Performed by: NURSE PRACTITIONER

## 2022-11-10 PROCEDURE — 85025 COMPLETE CBC W/AUTO DIFF WBC: CPT | Performed by: NURSE PRACTITIONER

## 2022-11-10 PROCEDURE — 80053 COMPREHEN METABOLIC PANEL: CPT | Performed by: NURSE PRACTITIONER

## 2022-11-10 PROCEDURE — 3078F PR MOST RECENT DIASTOLIC BLOOD PRESSURE < 80 MM HG: ICD-10-PCS | Mod: CPTII,S$GLB,, | Performed by: NURSE PRACTITIONER

## 2022-11-10 PROCEDURE — 99214 PR OFFICE/OUTPT VISIT, EST, LEVL IV, 30-39 MIN: ICD-10-PCS | Mod: S$GLB,,, | Performed by: NURSE PRACTITIONER

## 2022-11-10 PROCEDURE — 3074F PR MOST RECENT SYSTOLIC BLOOD PRESSURE < 130 MM HG: ICD-10-PCS | Mod: CPTII,S$GLB,, | Performed by: NURSE PRACTITIONER

## 2022-11-10 PROCEDURE — 1159F MED LIST DOCD IN RCRD: CPT | Mod: CPTII,S$GLB,, | Performed by: NURSE PRACTITIONER

## 2022-11-10 PROCEDURE — 99214 OFFICE O/P EST MOD 30 MIN: CPT | Mod: S$GLB,,, | Performed by: NURSE PRACTITIONER

## 2022-11-10 PROCEDURE — 3008F BODY MASS INDEX DOCD: CPT | Mod: CPTII,S$GLB,, | Performed by: NURSE PRACTITIONER

## 2022-11-10 NOTE — PROGRESS NOTES
Subjective:       Patient ID: Hernando Roman is a 42 y.o. male.    Chief Complaint: Follow-up (6m follow-up)    Patient is known, to me and presents with   Chief Complaint   Patient presents with    Follow-up     6m follow-up   .  Denies chest pain and shortness of breath.  Patient is here for check up and repeat of labs.   Follow-up  Pertinent negatives include no abdominal pain, arthralgias, chest pain, chills, congestion, coughing, diaphoresis, fatigue, fever, headaches, joint swelling, myalgias, nausea, neck pain, numbness, rash, sore throat, vomiting or weakness.   Review of Systems   Constitutional: Negative.  Negative for activity change, appetite change, chills, diaphoresis, fatigue, fever and unexpected weight change.   HENT: Negative.  Negative for congestion, ear discharge, ear pain, facial swelling, hearing loss, nosebleeds, postnasal drip, rhinorrhea, sinus pressure, sneezing, sore throat, tinnitus, trouble swallowing and voice change.    Eyes: Negative.  Negative for photophobia, pain, discharge, redness, itching and visual disturbance.   Respiratory: Negative.  Negative for apnea, cough, choking, chest tightness, shortness of breath, wheezing and stridor.    Cardiovascular: Negative.  Negative for chest pain, palpitations and leg swelling.   Gastrointestinal:  Negative for abdominal distention, abdominal pain, anal bleeding, blood in stool, constipation, diarrhea, nausea and vomiting.   Genitourinary: Negative.  Negative for difficulty urinating, dysuria, enuresis, flank pain, frequency, hematuria, penile discharge, penile pain, penile swelling, scrotal swelling, testicular pain and urgency.   Musculoskeletal: Negative.  Negative for arthralgias, back pain, gait problem, joint swelling, myalgias, neck pain and neck stiffness.   Skin: Negative.  Negative for color change, pallor, rash and wound.   Neurological:  Negative for dizziness, tremors, seizures, syncope, facial asymmetry, speech difficulty,  weakness, light-headedness, numbness and headaches.   Hematological:  Negative for adenopathy. Does not bruise/bleed easily.   Psychiatric/Behavioral: Negative.  Negative for agitation, dysphoric mood, sleep disturbance and suicidal ideas. The patient is not nervous/anxious.      Objective:      Physical Exam  Vitals and nursing note reviewed.   Constitutional:       General: He is not in acute distress.     Appearance: He is well-developed. He is not diaphoretic.   HENT:      Head: Normocephalic and atraumatic.      Right Ear: External ear normal.      Left Ear: External ear normal.      Nose: Nose normal.      Mouth/Throat:      Pharynx: No oropharyngeal exudate.   Eyes:      General:         Right eye: No discharge.         Left eye: No discharge.      Conjunctiva/sclera: Conjunctivae normal.      Pupils: Pupils are equal, round, and reactive to light.   Neck:      Thyroid: No thyromegaly.      Vascular: No JVD.      Trachea: No tracheal deviation.   Cardiovascular:      Rate and Rhythm: Normal rate and regular rhythm.      Heart sounds: Normal heart sounds. No murmur heard.    No friction rub. No gallop.   Pulmonary:      Effort: Pulmonary effort is normal. No respiratory distress.      Breath sounds: Normal breath sounds. No stridor. No wheezing or rales.   Chest:      Chest wall: No tenderness.   Abdominal:      General: Bowel sounds are normal. There is no distension.      Palpations: Abdomen is soft.      Tenderness: There is no abdominal tenderness. There is no guarding or rebound.   Musculoskeletal:         General: No tenderness. Normal range of motion.      Cervical back: Normal range of motion and neck supple.   Lymphadenopathy:      Cervical: No cervical adenopathy.   Skin:     General: Skin is warm and dry.      Capillary Refill: Capillary refill takes less than 2 seconds.      Coloration: Skin is not pale.      Findings: No erythema or rash.   Neurological:      General: No focal deficit present.      " Mental Status: He is alert and oriented to person, place, and time.      Cranial Nerves: No cranial nerve deficit.      Motor: No abnormal muscle tone.      Coordination: Coordination normal.      Deep Tendon Reflexes: Reflexes are normal and symmetric. Reflexes normal.   Psychiatric:         Mood and Affect: Mood normal.         Behavior: Behavior normal.         Thought Content: Thought content normal.         Judgment: Judgment normal.       Assessment:       1. Mixed hyperlipidemia    2. Low testosterone in male          Plan:   Hernando was seen today for follow-up.    Diagnoses and all orders for this visit:    Mixed hyperlipidemia  -     CBC Auto Differential; Future  -     Comprehensive Metabolic Panel; Future  -     TSH; Future  -     Lipid Panel; Future    Low testosterone in male  -     CBC Auto Differential; Future  -     Comprehensive Metabolic Panel; Future  -     TSH; Future  -     Lipid Panel; Future  -     Testosterone Panel; Future  -     Iron and TIBC; Future  -     Ferritin; Future    "This note will not be shared with the patient."  Lab drawn today CBC, CMP, TSH, FLP  Limit the cholesterol in your diet to less than 300 mg per day.  Fats should contribute no more than 20 to 35% of your daily calories.  Less than 7 to 10% of your calories should come from saturated fat.  Avoid saturated fat products e.g., butter, some oils, meat, and poultry fat contain a lot of saturated fat.  Check food labels for fat and cholesterol content. Choose the foods with less fat per serving.  Limit the amount of butter and margarine you eat.  Use salad dressings and margarine made with polyunsaturated and monunsaturated fats.  Use egg whites or egg substitutes rather than whole eggs.  Replace whole-milk dairy products with nonfat or low-fat mild, cheese, spreads, and yogurt.  Eat skinless chicken, turkey, fish, and meatless entrees more often than red meat.  Choose lean cuts of meat and trim off all visible fat. Keep " portion sizes moderate.  Avoid fatty desserts such as ice cream, cream-filled cakes, and cheesecakes. Choose fresh fruits, nonfat frozen yogurt, Popsicles, etc.  Reduce the amount of fried foods, vending machine food, and fast food you eat.  Eat fruits and vegetables (especially fresh fruits and leafy vegetables), beans, and whole grains daily. The fiber in these foods helps lower cholesterol.  Look for low-fat or nonfat varieties of the foods you like to eat or look for substitutes.  You may need to exercise 60 minutes a day to prevent weight gain and 90 minutes a day to lose weight.  RTC in six months.

## 2022-11-11 ENCOUNTER — PATIENT MESSAGE (OUTPATIENT)
Dept: INTERNAL MEDICINE | Facility: CLINIC | Age: 42
End: 2022-11-11
Payer: COMMERCIAL

## 2022-11-11 LAB
FERRITIN SERPL-MCNC: 675 NG/ML (ref 20–300)
IRON SERPL-MCNC: 126 UG/DL (ref 45–160)
SATURATED IRON: 32 % (ref 20–50)
TOTAL IRON BINDING CAPACITY: 397 UG/DL (ref 250–450)
TRANSFERRIN SERPL-MCNC: 268 MG/DL (ref 200–375)

## 2022-11-16 ENCOUNTER — TELEPHONE (OUTPATIENT)
Dept: HEPATOLOGY | Facility: CLINIC | Age: 42
End: 2022-11-16

## 2022-11-16 ENCOUNTER — OFFICE VISIT (OUTPATIENT)
Dept: HEPATOLOGY | Facility: CLINIC | Age: 42
End: 2022-11-16
Payer: COMMERCIAL

## 2022-11-16 ENCOUNTER — PATIENT MESSAGE (OUTPATIENT)
Dept: INTERNAL MEDICINE | Facility: CLINIC | Age: 42
End: 2022-11-16
Payer: COMMERCIAL

## 2022-11-16 DIAGNOSIS — E78.5 HYPERLIPIDEMIA, UNSPECIFIED HYPERLIPIDEMIA TYPE: ICD-10-CM

## 2022-11-16 DIAGNOSIS — R74.8 ELEVATED LIVER ENZYMES: ICD-10-CM

## 2022-11-16 DIAGNOSIS — R79.89 ELEVATED FERRITIN: Primary | ICD-10-CM

## 2022-11-16 DIAGNOSIS — K76.0 FATTY LIVER: ICD-10-CM

## 2022-11-16 DIAGNOSIS — E78.2 MIXED HYPERLIPIDEMIA: Primary | ICD-10-CM

## 2022-11-16 PROCEDURE — 1159F PR MEDICATION LIST DOCUMENTED IN MEDICAL RECORD: ICD-10-PCS | Mod: CPTII,95,, | Performed by: NURSE PRACTITIONER

## 2022-11-16 PROCEDURE — 99203 OFFICE O/P NEW LOW 30 MIN: CPT | Mod: 95,,, | Performed by: NURSE PRACTITIONER

## 2022-11-16 PROCEDURE — 1160F RVW MEDS BY RX/DR IN RCRD: CPT | Mod: CPTII,95,, | Performed by: NURSE PRACTITIONER

## 2022-11-16 PROCEDURE — 99203 PR OFFICE/OUTPT VISIT, NEW, LEVL III, 30-44 MIN: ICD-10-PCS | Mod: 95,,, | Performed by: NURSE PRACTITIONER

## 2022-11-16 PROCEDURE — 1159F MED LIST DOCD IN RCRD: CPT | Mod: CPTII,95,, | Performed by: NURSE PRACTITIONER

## 2022-11-16 PROCEDURE — 1160F PR REVIEW ALL MEDS BY PRESCRIBER/CLIN PHARMACIST DOCUMENTED: ICD-10-PCS | Mod: CPTII,95,, | Performed by: NURSE PRACTITIONER

## 2022-11-16 NOTE — PATIENT INSTRUCTIONS
1.Schedule Fibroscan to assess for fat and scar tissue in the liver  2. Recommend an Ultrasound of the liver every 1-2 years, depending upon Fibroscan results.  3. Repeat liver function tests in December.   4. We will also check immunity markers for Hepatitis A and B and arrange for vaccination if needed.  5. Avoid alcohol and herbal supplements/alternative remedies.  6. Return to clinic in 4 weeks.    There is no FDA approved therapy for non-alcoholic fatty liver disease. Therefore, these things are important:  1. Maintain a healthy weight, through diet and exercise.  2. Low carb/sugar, high fiber and protein diet.Try to limit your carb intake to LESS than 30-45 grams of carbs with a meal or LESS than 5-10 grams with any snack (total of any snack foods eaten during that time). Use MyFitness Pal adrian to add up your carbs through the day. Do NOT drink any beverages with calories or carbs (this can lead to high blood sugar and weight gain). Also, some of our patients have been very successful with weight loss using the pre made/planned meal planning services that limit calories and portion size (one example is Sensible Meals but there are many out there).  3. Exercise, 5 days per week, 30 minutes per day, as tolerated.  4. Recommend good control of cholesterol, blood pressure, & blood sugar levels.  5. Speak with PCP about starting a statin for treatment of HLD.    In some people, fatty liver can progress to steatohepatitis (inflamatory fatty liver) and possibly to cirrhosis, putting one at increased risk for liver cancer, liver failure, and death. Therefore, the lifestyle changes are very important to decrease this risk.     Website with information about fatty liver and inflammation related to fatty liver (JAY) = [email or web address]  AND [email or web address]

## 2022-11-16 NOTE — Clinical Note
Please contact patient to arrange a f/u visit with me in 3-4 weeks with labs and Fibroscan same day as the visit. Thanks!

## 2022-11-16 NOTE — PROGRESS NOTES
"The patient location is: Home ([place], [place]).   The chief complaint leading to consultation is: Elevated Ferritin    Visit type: audiovisual    Face to Face time with patient: 20  30 minutes of total time spent on the encounter, which includes face to face time and non-face to face time preparing to see the patient (eg, review of tests), Obtaining and/or reviewing separately obtained history, Documenting clinical information in the electronic or other health record, Independently interpreting results (not separately reported) and communicating results to the patient/family/caregiver, or Care coordination (not separately reported).     Each patient to whom he or she provides medical services by telemedicine is:  (1) informed of the relationship between the physician and patient and the respective role of any other health care provider with respect to management of the patient; and (2) notified that he or she may decline to receive medical services by telemedicine and may withdraw from such care at any time.    Notes:       [facility] New Patient Visit    Reason for Visit: Elevated Ferritin    PCP: Michela Licea    HPI:  This is a 42 y.o. male with PMH noted below, here for elevated ferritin levels.    The patient's risk factors for fatty liver disease include:     Obesity                                        No; BMI 25.72  Dyslipidemia                                Yes; Not currently on treatment (last lipid panel drawn in 11/2022.  Insulin resistance/Diabetes         No; No HgbA1c on file.  Family history of diabetes           Yes; GM with history of DM.    He has had elevated liver enzymes in a hepatocellular pattern since at least 10/2018. Most recent LFT's in 11/2022 showed an ALT 91, AST of 41 with intact synthetic function. Ferritin was noted to be > 600. Iron saturation is normal (32%). He notes that test results from "23 and me" showed one copy of H63D mutation. Abdominal Ultrasound in " "5/2022 showed fatty infiltration of the liver, with no focal liver lesions. Prior CT in 2013 showed hepatomegaly and hepatic steatosis. He has never undergone a liver biopsy or non-invasive staging exam. He c/o chronic fatigue. He is no longer on testosterone replacement therapy.  He has no known family history of liver disease. He does not drink alcohol heavily or use illicit drugs. He is well appearing, and denies any signs or symptoms of hepatic decompensation including jaundice, dark urine, pruritus, abdominal distention, lower extremity edema, hematemesis, melena, or periods of confusion suggestive of hepatic encephalopathy.      PMHX:  has a past medical history of Allergy, Facet arthropathy, cervical, and Food poisoning due to bacteria (2006).    PSHX:  has a past surgical history that includes Esophagogastroduodenoscopy; [place] tooth extraction (Bilateral, 2007); Appendectomy; Laparoscopic cholecystectomy (N/A, 10/22/2019); and Cholecystectomy.    The patient's social and family histories were reviewed by me and updated in the appropriate section of the electronic medical record.    Review of patient's allergies indicates:  No Known Allergies    Current Outpatient Medications on File Prior to Visit   Medication Sig Dispense Refill    colestipoL (COLESTID) 1 gram Tab Take 1 tablet (1 g total) by mouth 2 (two) times daily. 60 tablet 5    sildenafiL (VIAGRA) 100 MG tablet Take 1 tablet (100 mg total) by mouth daily as needed for Erectile Dysfunction. 10 tablet 0    syringe-needle,safety,disp unt 3 mL 20 gauge x 1 1/2" Syrg 1 Syringe by Misc.(Non-Drug; Combo Route) route every 14 (fourteen) days. 100 each 0    testosterone cypionate (DEPOTESTOTERONE CYPIONATE) 100 mg/mL injection Inject 1 mL (100 mg total) into the muscle every 14 (fourteen) days. 10 mL 0     No current facility-administered medications on file prior to visit.       SOCIAL HISTORY:   Social History     Tobacco Use   Smoking Status Former    " Packs/day: 1.00    Years: 18.00    Pack years: 18.00    Types: Cigarettes    Start date: 1996    Quit date: 2013    Years since quittin.3   Smokeless Tobacco Former    Quit date: 1998       Social History     Substance and Sexual Activity   Alcohol Use Yes    Comment: Once a month at the most-3 or 4 drinks       Social History     Substance and Sexual Activity   Drug Use No     ROS:   GENERAL: Denies fever, chills, weight loss/gain, + fatigue  HEENT: Denies headaches, dizziness, vision/hearing changes  CARDIOVASCULAR: Denies chest pain, palpitations, or edema  RESPIRATORY: Denies dyspnea, cough  GI: Denies abdominal pain, rectal bleeding, nausea, vomiting. No change in bowel pattern or color  : Denies dysuria, hematuria   SKIN: Denies rash, itching   NEURO: Denies confusion, memory loss, or mood changes  PSYCH: Denies depression or anxiety  HEME/LYMPH: Denies easy bruising or bleeding    Objective Findings:    PHYSICAL EXAM:   Friendly White male, in no acute distress; alert and oriented to person, place and time.  VITALS: There were no vitals taken for this visit. (Not done - Telehealth visit).  HENT: Normocephalic, without obvious abnormality. Oral mucosa pink and moist. Dentition good.  EYES: Sclerae anicteric.   NECK: No obvious masses.  CARDIOVASCULAR: No peripheral edema per patient report.  RESPIRATORY: Normal respiratory effort.   GI: No abdominal distention, per patient report.  EXTREMITIES: No peripheral edema, per patient report.  SKIN: Warm and dry. No jaundice. No rashes noted to exposed skin.   NEURO: No asterixis.  PSYCH:  Memory intact. Thought and speech pattern appropriate. Behavior normal. No depression or anxiety noted.    DIAGNOSTIC STUDIES:    CT ABDOMEN PELVIS WITH CONTRAST 2013:     RESULTS:      There are mild dependent changes within the lung bases. No basilar    consolidation or pleural effusions. The base of the heart appears normal. The    aorta is of normal  caliber and tapers appropriately demonstrating mild    calcified atheromatous disease.        The liver is enlarged and diffusely hypodense compatible with hepatic    steatosis. No focal hepatic lesions are identified. The spleen, pancreas, adrenal    glands and kidneys are normal in size, shape and contour without focal    lesions. The urinary bladder is well distended and within normal limits.        There is a moderate amount of stool throughout the colon suggesting    constipation. The appendix is normal. No free air, free fluid or obstruction is    identified. No pathologically enlarged abdominal or pelvic lymph nodes are    seen.        The skeletal structures are intact.        IMPRESSION:    1. HEPATOMEGALY WITH HEPATIC STEATOSIS.       CORRELATION WITH LIVER ENZYMES IS RECOMMENDED.    2. NO IMAGING ABNORMALITY IS IDENTIFIED TO       CORRESPOND TO THE PATIENT'S CLINICAL SYMPTOMS.       SPECIFICALLY, THERE IS NO EVIDENCE FOR APPENDICITS.     US Abdomen Limited  Narrative: EXAMINATION:  US ABDOMEN LIMITED    CLINICAL HISTORY:  elevated liver enzymes rule out fatty liver; Abnormal levels of other serum enzymes    TECHNIQUE:  Limited ultrasound of the right upper quadrant of the abdomen (including pancreas, liver, gallbladder, common bile duct, and right kidney) was performed.    COMPARISON:  Ultrasound 09/30/2019.  CT 10/21/2016.    FINDINGS:  Liver: Top-normal in size measuring 18.2 cm. Parenchyma demonstrates a heterogeneous pattern of increased echogenicity consistent with diffuse fatty infiltration.  No focal hepatic lesions.    Gallbladder: Previous cholecystectomy.    Pancreas: Obscured by overlying bowel gas.    Biliary system: The common duct is not dilated, measuring 3.0 mm.  No intrahepatic ductal dilatation.    Right kidney: Normal in size and echogenicity measuring 9.1 centimeters.  No changes of hydronephrosis.    IVC obscured by overlying bowel gas.    Miscellaneous: No upper abdominal  ascites.  Impression: 1. Hepatic steatosis  2. Previous cholecystectomy.  3. Poor evaluation of the pancreas.    Electronically signed by: Faizan Braswell  Date:    05/27/2022  Time:    15:38    FIBROSCAN - Ordered at visit.    EDUCATION:  Per AVS.    ASSESSMENT & PLAN:  42 y.o. White male with:    1. Elevated ferritin  FibroScan [place] (Vibration Controlled Transient Elastography)    Hepatic Function Panel    Hepatitis A antibody, IgG    Hepatitis B Core Antibody, Total    Hepatitis B Surface Antibody, Qual/Quant    Hepatitis B Surface Antigen    Hepatitis C Antibody    Hemoglobin A1C    Phosphatidylethanol (PETH)      2. Elevated liver enzymes  FibroScan [place] (Vibration Controlled Transient Elastography)    Hepatic Function Panel    Hepatitis A antibody, IgG    Hepatitis B Core Antibody, Total    Hepatitis B Surface Antibody, Qual/Quant    Hepatitis B Surface Antigen    Hepatitis C Antibody    Hemoglobin A1C    Phosphatidylethanol (PETH)      3. Fatty liver  FibroScan [place] (Vibration Controlled Transient Elastography)    Hepatic Function Panel    Hepatitis A antibody, IgG    Hepatitis B Core Antibody, Total    Hepatitis B Surface Antibody, Qual/Quant    Hepatitis B Surface Antigen    Hepatitis C Antibody    Hemoglobin A1C    Phosphatidylethanol (PETH)      4. Hyperlipidemia, unspecified hyperlipidemia type  FibroScan [place] (Vibration Controlled Transient Elastography)    Hepatic Function Panel    Hepatitis A antibody, IgG    Hepatitis B Core Antibody, Total    Hepatitis B Surface Antibody, Qual/Quant    Hepatitis B Surface Antigen    Hepatitis C Antibody    Hemoglobin A1C    Phosphatidylethanol (PETH)        - Schedule Fibroscan to stage liver disease.  - Recommend an Ultrasound of the liver every 1-2 years, depending upon Fibroscan results.  - Repeat liver function tests in December.   - Will also check titer levels for Hepatitis A and B with next blood draw, and arrange for vaccination if  needed.  - Avoid alcohol and herbal supplements/alternative remedies.  - Maintain a healthy weight, through diet and exercise.  - Recommend good control of cholesterol, blood pressure, & blood sugar levels.  - Speak with PCP about starting a statin for treatment of HLD.    Follow up in about 4 weeks (around 12/14/2022). with labs and Fibroscan before visit.    Thank you for allowing me to participate in the care of Hernando Roman       Hepatology Nurse Practitioner  [facility]  11/16/2022 @ 1200    CC'ed note to:   No ref. provider found  Michela Licea NP

## 2022-11-16 NOTE — TELEPHONE ENCOUNTER
----- Message from Tanna Pool NP sent at 11/16/2022 12:03 PM CST -----  Please contact patient to arrange a f/u visit with me in 3-4 weeks with labs and Fibroscan same day as the visit. Thanks!

## 2022-11-17 ENCOUNTER — PATIENT MESSAGE (OUTPATIENT)
Dept: FAMILY MEDICINE | Facility: CLINIC | Age: 42
End: 2022-11-17
Payer: COMMERCIAL

## 2022-11-17 ENCOUNTER — PATIENT MESSAGE (OUTPATIENT)
Dept: INTERNAL MEDICINE | Facility: CLINIC | Age: 42
End: 2022-11-17
Payer: COMMERCIAL

## 2022-11-17 LAB
ALBUMIN SERPL-MCNC: 5 G/DL (ref 3.6–5.1)
SHBG SERPL-SCNC: 11 NMOL/L (ref 10–50)
TESTOST FREE SERPL-MCNC: 134.3 PG/ML (ref 46–224)
TESTOST SERPL-MCNC: 507 NG/DL (ref 250–1100)
TESTOSTERONE.FREE+WB SERPL-MCNC: 305.4 NG/DL (ref 110–575)

## 2022-11-17 RX ORDER — ROSUVASTATIN CALCIUM 5 MG/1
5 TABLET, COATED ORAL NIGHTLY
Qty: 30 TABLET | Refills: 5 | Status: SHIPPED | OUTPATIENT
Start: 2022-11-17 | End: 2023-01-26 | Stop reason: SDUPTHER

## 2022-12-13 ENCOUNTER — LAB VISIT (OUTPATIENT)
Dept: LAB | Facility: HOSPITAL | Age: 42
End: 2022-12-13
Payer: COMMERCIAL

## 2022-12-13 ENCOUNTER — OFFICE VISIT (OUTPATIENT)
Dept: HEPATOLOGY | Facility: CLINIC | Age: 42
End: 2022-12-13
Payer: COMMERCIAL

## 2022-12-13 ENCOUNTER — PROCEDURE VISIT (OUTPATIENT)
Dept: HEPATOLOGY | Facility: CLINIC | Age: 42
End: 2022-12-13
Payer: COMMERCIAL

## 2022-12-13 VITALS
HEART RATE: 81 BPM | SYSTOLIC BLOOD PRESSURE: 133 MMHG | OXYGEN SATURATION: 96 % | DIASTOLIC BLOOD PRESSURE: 86 MMHG | TEMPERATURE: 96 F

## 2022-12-13 DIAGNOSIS — R79.89 ELEVATED FERRITIN: ICD-10-CM

## 2022-12-13 DIAGNOSIS — K76.0 FATTY LIVER: Primary | ICD-10-CM

## 2022-12-13 DIAGNOSIS — R79.89 ELEVATED FERRITIN: Primary | ICD-10-CM

## 2022-12-13 DIAGNOSIS — K76.0 FATTY LIVER: ICD-10-CM

## 2022-12-13 DIAGNOSIS — R74.8 ELEVATED LIVER ENZYMES: ICD-10-CM

## 2022-12-13 DIAGNOSIS — E78.5 HYPERLIPIDEMIA, UNSPECIFIED HYPERLIPIDEMIA TYPE: ICD-10-CM

## 2022-12-13 DIAGNOSIS — E78.2 MIXED HYPERLIPIDEMIA: ICD-10-CM

## 2022-12-13 LAB
ALBUMIN SERPL BCP-MCNC: 4.6 G/DL (ref 3.5–5.2)
ALP SERPL-CCNC: 56 U/L (ref 55–135)
ALT SERPL W/O P-5'-P-CCNC: 71 U/L (ref 10–44)
AST SERPL-CCNC: 27 U/L (ref 10–40)
BILIRUB DIRECT SERPL-MCNC: 0.2 MG/DL (ref 0.1–0.3)
BILIRUB SERPL-MCNC: 0.8 MG/DL (ref 0.1–1)
ESTIMATED AVG GLUCOSE: 94 MG/DL (ref 68–131)
HAV IGG SER QL IA: NORMAL
HBA1C MFR BLD: 4.9 % (ref 4–5.6)
HBV CORE AB SERPL QL IA: NORMAL
HBV SURFACE AG SERPL QL IA: NORMAL
HCV AB SERPL QL IA: NORMAL
PROT SERPL-MCNC: 7.7 G/DL (ref 6–8.4)

## 2022-12-13 PROCEDURE — 99214 PR OFFICE/OUTPT VISIT, EST, LEVL IV, 30-39 MIN: ICD-10-PCS | Mod: S$GLB,,, | Performed by: NURSE PRACTITIONER

## 2022-12-13 PROCEDURE — 86704 HEP B CORE ANTIBODY TOTAL: CPT | Performed by: NURSE PRACTITIONER

## 2022-12-13 PROCEDURE — 36415 COLL VENOUS BLD VENIPUNCTURE: CPT | Performed by: NURSE PRACTITIONER

## 2022-12-13 PROCEDURE — 1160F RVW MEDS BY RX/DR IN RCRD: CPT | Mod: CPTII,S$GLB,, | Performed by: NURSE PRACTITIONER

## 2022-12-13 PROCEDURE — 3079F DIAST BP 80-89 MM HG: CPT | Mod: CPTII,S$GLB,, | Performed by: NURSE PRACTITIONER

## 2022-12-13 PROCEDURE — 83036 HEMOGLOBIN GLYCOSYLATED A1C: CPT | Performed by: NURSE PRACTITIONER

## 2022-12-13 PROCEDURE — 80321 ALCOHOLS BIOMARKERS 1OR 2: CPT | Performed by: NURSE PRACTITIONER

## 2022-12-13 PROCEDURE — 3044F PR MOST RECENT HEMOGLOBIN A1C LEVEL <7.0%: ICD-10-PCS | Mod: CPTII,S$GLB,, | Performed by: NURSE PRACTITIONER

## 2022-12-13 PROCEDURE — 3044F HG A1C LEVEL LT 7.0%: CPT | Mod: CPTII,S$GLB,, | Performed by: NURSE PRACTITIONER

## 2022-12-13 PROCEDURE — 1159F MED LIST DOCD IN RCRD: CPT | Mod: CPTII,S$GLB,, | Performed by: NURSE PRACTITIONER

## 2022-12-13 PROCEDURE — 87340 HEPATITIS B SURFACE AG IA: CPT | Performed by: NURSE PRACTITIONER

## 2022-12-13 PROCEDURE — 99999 PR PBB SHADOW E&M-EST. PATIENT-LVL III: ICD-10-PCS | Mod: PBBFAC,,, | Performed by: NURSE PRACTITIONER

## 2022-12-13 PROCEDURE — 86803 HEPATITIS C AB TEST: CPT | Performed by: NURSE PRACTITIONER

## 2022-12-13 PROCEDURE — 3075F SYST BP GE 130 - 139MM HG: CPT | Mod: CPTII,S$GLB,, | Performed by: NURSE PRACTITIONER

## 2022-12-13 PROCEDURE — 80076 HEPATIC FUNCTION PANEL: CPT | Performed by: NURSE PRACTITIONER

## 2022-12-13 PROCEDURE — 86790 VIRUS ANTIBODY NOS: CPT | Performed by: NURSE PRACTITIONER

## 2022-12-13 PROCEDURE — 99214 OFFICE O/P EST MOD 30 MIN: CPT | Mod: S$GLB,,, | Performed by: NURSE PRACTITIONER

## 2022-12-13 PROCEDURE — 76981 FIBROSCAN NEW ORLEANS: ICD-10-PCS | Mod: S$GLB,,, | Performed by: NURSE PRACTITIONER

## 2022-12-13 PROCEDURE — 1159F PR MEDICATION LIST DOCUMENTED IN MEDICAL RECORD: ICD-10-PCS | Mod: CPTII,S$GLB,, | Performed by: NURSE PRACTITIONER

## 2022-12-13 PROCEDURE — 99999 PR PBB SHADOW E&M-EST. PATIENT-LVL III: CPT | Mod: PBBFAC,,, | Performed by: NURSE PRACTITIONER

## 2022-12-13 PROCEDURE — 76981 USE PARENCHYMA: CPT | Mod: S$GLB,,, | Performed by: NURSE PRACTITIONER

## 2022-12-13 PROCEDURE — 1160F PR REVIEW ALL MEDS BY PRESCRIBER/CLIN PHARMACIST DOCUMENTED: ICD-10-PCS | Mod: CPTII,S$GLB,, | Performed by: NURSE PRACTITIONER

## 2022-12-13 PROCEDURE — 3079F PR MOST RECENT DIASTOLIC BLOOD PRESSURE 80-89 MM HG: ICD-10-PCS | Mod: CPTII,S$GLB,, | Performed by: NURSE PRACTITIONER

## 2022-12-13 PROCEDURE — 3075F PR MOST RECENT SYSTOLIC BLOOD PRESS GE 130-139MM HG: ICD-10-PCS | Mod: CPTII,S$GLB,, | Performed by: NURSE PRACTITIONER

## 2022-12-13 PROCEDURE — 86706 HEP B SURFACE ANTIBODY: CPT | Performed by: NURSE PRACTITIONER

## 2022-12-13 NOTE — PROGRESS NOTES
"[facility] Established Patient Visit    Reason for Visit: Fatty Liver    PCP: Michela Licea    HPI:  This is a 42 y.o. male with PMH noted below, here for follow up for fatty liver.    The patient's risk factors for fatty liver disease include:     Obesity                                        No; BMI 25.7.  Dyslipidemia                                Yes; On Crestor 5 mg PO daily - started in mid 11/2022.  Insulin resistance/Diabetes         No; Last HgbA1c was 4.9% (12/2022)  Family history of diabetes           Yes; GM with history of DM.    He has had elevated liver enzymes in a hepatocellular pattern since at least 10/2018. Most recent LFT's in 12/2022 showed improvement with an ALT 71, AST of 27, with intact synthetic function. Ferritin was noted to be > 600. Iron saturation is normal (32%). He notes that test results from " and me" showed one copy of H63D mutation.     Abdominal Ultrasound in 5/2022 showed fatty infiltration of the liver, with no focal liver lesions. Prior CT in 2013 showed hepatomegaly and hepatic steatosis. He has never undergone a liver biopsy. He has a history of chronic fatigue. He is no longer on testosterone replacement therapy. He started low dose Crestor in mid November. His fatigue is significantly improved since starting statin.    He has no known family history of liver disease. He does not drink alcohol heavily or use illicit drugs. Viral hepatitis testing was negative. Fibroscan today to stage his liver disease is suggestive of moderate fatty infiltration (S2), with F0-F1 fibrosis and a low likelihood of cirrhosis. He is well appearing, and has no signs or symptoms of hepatic decompensation including jaundice, dark urine, pruritus, abdominal distention, lower extremity edema, hematemesis, melena, or periods of confusion suggestive of hepatic encephalopathy.      PMHX:  has a past medical history of Allergy, Facet arthropathy, cervical, Fatty liver, Food " "poisoning due to bacteria (), and HLD (hyperlipidemia).    PSHX:  has a past surgical history that includes Esophagogastroduodenoscopy; [place] tooth extraction (Bilateral, ); Appendectomy; Laparoscopic cholecystectomy (N/A, 10/22/2019); and Cholecystectomy.    The patient's social and family histories were reviewed by me and updated in the appropriate section of the electronic medical record.    Review of patient's allergies indicates:  No Known Allergies    Current Outpatient Medications on File Prior to Visit   Medication Sig Dispense Refill    rosuvastatin (CRESTOR) 5 MG tablet Take 1 tablet (5 mg total) by mouth every evening. 30 tablet 5    [DISCONTINUED] colestipoL (COLESTID) 1 gram Tab Take 1 tablet (1 g total) by mouth 2 (two) times daily. (Patient not taking: Reported on 2022) 60 tablet 5    [DISCONTINUED] sildenafiL (VIAGRA) 100 MG tablet Take 1 tablet (100 mg total) by mouth daily as needed for Erectile Dysfunction. (Patient not taking: Reported on 2022) 10 tablet 0    [DISCONTINUED] syringe-needle,safety,disp unt 3 mL 20 gauge x 1 1/2" Syrg 1 Syringe by Misc.(Non-Drug; Combo Route) route every 14 (fourteen) days. (Patient not taking: Reported on 2022) 100 each 0    [DISCONTINUED] testosterone cypionate (DEPOTESTOTERONE CYPIONATE) 100 mg/mL injection Inject 1 mL (100 mg total) into the muscle every 14 (fourteen) days. (Patient not taking: Reported on 2022) 10 mL 0     No current facility-administered medications on file prior to visit.     SOCIAL HISTORY:   Social History     Tobacco Use   Smoking Status Former    Packs/day: 1.00    Years: 18.00    Pack years: 18.00    Types: Cigarettes    Start date: 1996    Quit date: 2013    Years since quittin.3   Smokeless Tobacco Former    Quit date: 1998     Social History     Substance and Sexual Activity   Alcohol Use Not Currently     Social History     Substance and Sexual Activity   Drug Use No     ROS: "   GENERAL: Denies fever, chills, weight loss/gain  HEENT: Denies headaches, dizziness, vision/hearing changes  CARDIOVASCULAR: Denies chest pain, palpitations, or edema  RESPIRATORY: Denies dyspnea, cough  GI: Denies abdominal pain, rectal bleeding, nausea, vomiting. No change in bowel pattern or color  : Denies dysuria, hematuria   SKIN: Denies rash, itching   NEURO: Denies confusion, memory loss, or mood changes  PSYCH: Denies depression or anxiety  HEME/LYMPH: Denies easy bruising or bleeding    Objective Findings:    PHYSICAL EXAM:   Friendly White male, in no acute distress; alert and oriented to person, place and time.  VITALS: /86 (BP Location: Right arm, Patient Position: Sitting, BP Method: Large (Automatic))   Pulse 81   Temp 96.3 °F (35.7 °C) (Oral)   SpO2 96%  (Not done - Telehealth visit).  HENT: Normocephalic, without obvious abnormality.  EYES: Sclerae anicteric.   NECK: No obvious masses.  CARDIOVASCULAR: Regular Rate. No peripheral edema.  RESPIRATORY: Normal respiratory effort.   GI: No abdominal distention, per patient report.  EXTREMITIES: No peripheral edema, per patient report.  SKIN: Warm and dry. No jaundice. No rashes noted to exposed skin.   NEURO: No asterixis.  PSYCH:  Memory intact. Thought and speech pattern appropriate. Behavior normal. No depression or anxiety noted.    DIAGNOSTIC STUDIES:    CT ABDOMEN PELVIS WITH CONTRAST 9/13/2013:     RESULTS:      There are mild dependent changes within the lung bases. No basilar    consolidation or pleural effusions. The base of the heart appears normal. The    aorta is of normal caliber and tapers appropriately demonstrating mild    calcified atheromatous disease.        The liver is enlarged and diffusely hypodense compatible with hepatic    steatosis. No focal hepatic lesions are identified. The spleen, pancreas, adrenal    glands and kidneys are normal in size, shape and contour without focal    lesions. The urinary bladder is  well distended and within normal limits.        There is a moderate amount of stool throughout the colon suggesting    constipation. The appendix is normal. No free air, free fluid or obstruction is    identified. No pathologically enlarged abdominal or pelvic lymph nodes are    seen.        The skeletal structures are intact.        IMPRESSION:    1. HEPATOMEGALY WITH HEPATIC STEATOSIS.       CORRELATION WITH LIVER ENZYMES IS RECOMMENDED.    2. NO IMAGING ABNORMALITY IS IDENTIFIED TO       CORRESPOND TO THE PATIENT'S CLINICAL SYMPTOMS.       SPECIFICALLY, THERE IS NO EVIDENCE FOR APPENDICITS.     US Abdomen Limited  Narrative: EXAMINATION:  US ABDOMEN LIMITED    CLINICAL HISTORY:  elevated liver enzymes rule out fatty liver; Abnormal levels of other serum enzymes    TECHNIQUE:  Limited ultrasound of the right upper quadrant of the abdomen (including pancreas, liver, gallbladder, common bile duct, and right kidney) was performed.    COMPARISON:  Ultrasound 09/30/2019.  CT 10/21/2016.    FINDINGS:  Liver: Top-normal in size measuring 18.2 cm. Parenchyma demonstrates a heterogeneous pattern of increased echogenicity consistent with diffuse fatty infiltration.  No focal hepatic lesions.    Gallbladder: Previous cholecystectomy.    Pancreas: Obscured by overlying bowel gas.    Biliary system: The common duct is not dilated, measuring 3.0 mm.  No intrahepatic ductal dilatation.    Right kidney: Normal in size and echogenicity measuring 9.1 centimeters.  No changes of hydronephrosis.    IVC obscured by overlying bowel gas.    Miscellaneous: No upper abdominal ascites.  Impression: 1. Hepatic steatosis  2. Previous cholecystectomy.  3. Poor evaluation of the pancreas.    Electronically signed by: Faizan Braswell  Date:    05/27/2022  Time:    15:38    FIBROSCAN 12/13/2022:    Findings  Median liver stiffness score:  3.9  CAP Reading: dB/m:  279     IQR/med %:  10  Interpretation  Fibrosis interpretation is based on medial  liver stiffness - Kilopascal (kPa).     Fibrosis Stage:  F 0-1  Steatosis interpretation is based on controlled attenuation parameter - (dB/m).     Steatosis Grade:  S2    EDUCATION:  Per AVS.    ASSESSMENT & PLAN:  42 y.o. White male with:    1. Fatty liver  Hepatic Function Panel    Lipid Panel    Iron and TIBC    Ferritin      2. Elevated liver enzymes  Hepatic Function Panel    Lipid Panel    Iron and TIBC    Ferritin      3. Mixed hyperlipidemia  Hepatic Function Panel    Lipid Panel    Iron and TIBC    Ferritin      4. Elevated ferritin  Iron and TIBC    Ferritin        - Fibroscan today to stage liver disease.  - Recommend an Ultrasound of the liver every 2 years, next due 5/2024.  - Repeat labs in March to monitor liver function, lipids and iron studies.   - Avoid alcohol and herbal supplements/alternative remedies.  - Maintain a healthy weight, through diet and exercise.  - Continue low dose statin per PCP for the treatment of HLD.  - Recommend good control of cholesterol, blood pressure, & blood sugar levels.  - Return to clinic to be determined by lab results.    Thank you for allowing me to participate in the care of Hernando Roman       Hepatology Nurse Practitioner  [facility]  12/13/2022 @ 8525    CC'ed note to:   No ref. provider found  Michela Licea NP

## 2022-12-13 NOTE — PROCEDURES
FibroScan [place]    Date/Time: 12/13/2022 1:45 PM  Performed by: Tanna Pool NP  Authorized by: Tanna Pool NP     Diagnosis:  Other    Probe:  M    Universal Protocol: Patient's identity, procedure and site were verified, confirmatory pause was performed.  Discussed procedure including risks and potential complications.  Questions answered.  Patient verbalizes understanding and wishes to proceed with VCTE.     Procedure: After providing explanations of the procedure, patient was placed in the supine position with right arm in maximum abduction to allow optimal exposure of right lateral abdomen.  Patient was briefly assessed, Testing was performed in the mid-axillary location, 50Hz Shear Wave pulses were applied and the resulting Shear Wave and Propagation Speed detected with a 3.5 MHz ultrasonic signal, using the FibroScan probe, Skin to liver capsule distance and liver parenchyma were accessed during the entire examination with the FibroScan probe, Patient was instructed to breathe normally and to abstain from sudden movements during the procedure, allowing for random measurements of liver stiffness. At least 10 Shear Waves were produced, Individual measurements of each Shear Wave were calculated.  Patient tolerated the procedure well with no complications.  Meets discharge criteria as was dismissed.  Rates pain 0 out of 10.  Patient will follow up with ordering provider to review results.    Findings  Median liver stiffness score:  3.9  CAP Reading: dB/m:  279    IQR/med %:  10  Interpretation  Fibrosis interpretation is based on medial liver stiffness - Kilopascal (kPa).    Fibrosis Stage:  F 0-1  Steatosis interpretation is based on controlled attenuation parameter - (dB/m).    Steatosis Grade:  S2

## 2022-12-13 NOTE — PATIENT INSTRUCTIONS
- Fibroscan today to stage liver disease.  - Recommend an Ultrasound of the liver every 2 years, next due 5/2024.  - Repeat labs in March to monitor liver function, lipids and iron studies.   - Avoid alcohol and herbal supplements/alternative remedies.  - Maintain a healthy weight, through diet and exercise.  - Continue low dose statin per PCP for the treatment of HLD.  - Recommend good control of cholesterol, blood pressure, & blood sugar levels.  - Return to clinic to be determined by lab results.

## 2022-12-16 LAB
CLINICAL BIOCHEMIST REVIEW: NORMAL
HBV SURFACE AB SER QL IA: NEGATIVE
HBV SURFACE AB SERPL IA-ACNC: <3 MIU/ML
PLPETH BLD-MCNC: <10 NG/ML
POPETH BLD-MCNC: <10 NG/ML

## 2023-01-26 ENCOUNTER — TELEPHONE (OUTPATIENT)
Dept: INTERNAL MEDICINE | Facility: CLINIC | Age: 43
End: 2023-01-26
Payer: COMMERCIAL

## 2023-01-26 DIAGNOSIS — E78.2 MIXED HYPERLIPIDEMIA: ICD-10-CM

## 2023-01-26 RX ORDER — ROSUVASTATIN CALCIUM 5 MG/1
5 TABLET, COATED ORAL NIGHTLY
Qty: 180 TABLET | Refills: 0
Start: 2023-01-26 | End: 2023-04-03 | Stop reason: SDUPTHER

## 2023-01-26 NOTE — TELEPHONE ENCOUNTER
----- Message from Autumn Luna RN sent at 1/23/2023  4:55 PM CST -----  Regarding: Statin in Med List with an End Date

## 2023-04-06 DIAGNOSIS — E78.2 MIXED HYPERLIPIDEMIA: ICD-10-CM

## 2023-04-06 RX ORDER — ROSUVASTATIN CALCIUM 5 MG/1
5 TABLET, COATED ORAL NIGHTLY
Qty: 180 TABLET | Refills: 0
Start: 2023-04-06 | End: 2023-04-10 | Stop reason: SDUPTHER

## 2023-04-06 NOTE — TELEPHONE ENCOUNTER
----- Message from Radhika [facility] MA sent at 2023  3:53 PM CDT -----  Hernando Roman  MRN: [id number]  : 1980  PCP: Michela Licea  Home Phone      [phone]  Work Phone      [phone]  Mobile          [phone]      MESSAGE:     Please re-send Rosuvastatin to [facility] ([place]  Script did not go through

## 2023-04-10 ENCOUNTER — TELEPHONE (OUTPATIENT)
Dept: INTERNAL MEDICINE | Facility: CLINIC | Age: 43
End: 2023-04-10
Payer: COMMERCIAL

## 2023-04-10 DIAGNOSIS — E78.2 MIXED HYPERLIPIDEMIA: ICD-10-CM

## 2023-04-10 RX ORDER — ROSUVASTATIN CALCIUM 5 MG/1
5 TABLET, COATED ORAL NIGHTLY
Qty: 180 TABLET | Refills: 0 | Status: SHIPPED | OUTPATIENT
Start: 2023-04-10 | End: 2023-05-31

## 2023-04-10 NOTE — TELEPHONE ENCOUNTER
----- Message from Radhika [facility] MA sent at 4/10/2023 10:32 AM CDT -----    ----- Message -----  From: Radhika [facility] MA  Sent: 4/10/2023  10:31 AM CDT  To: Vinayak [facility]IL[facility] Staff    Hernando Roman  MRN: [id number]  : 1980  PCP: Michela Licea  Home Phone      [phone]  Work Phone      [phone]  Mobile          [phone]      MESSAGE:     Please re-send his Rosuvastatin to [facility] ([facility]    Script never went through.  Please Re-send

## 2023-04-10 NOTE — TELEPHONE ENCOUNTER
----- Message from Radhika [facility] MA sent at 4/10/2023 10:30 AM CDT -----  Hernando Roman  MRN: [id number]  : 1980  PCP: Michela Licea  Home Phone      [phone]  Work Phone      [phone]  Mobile          [phone]      MESSAGE:     Please re-send his Rosuvastatin to [facility] ([facility])    Script never went through.  Please Re-send

## 2023-05-24 ENCOUNTER — TELEPHONE (OUTPATIENT)
Dept: INTERNAL MEDICINE | Facility: CLINIC | Age: 43
End: 2023-05-24
Payer: COMMERCIAL

## 2023-05-24 ENCOUNTER — LAB VISIT (OUTPATIENT)
Dept: LAB | Facility: HOSPITAL | Age: 43
End: 2023-05-24
Attending: NURSE PRACTITIONER
Payer: COMMERCIAL

## 2023-05-24 DIAGNOSIS — R74.8 ELEVATED LIVER ENZYMES: ICD-10-CM

## 2023-05-24 DIAGNOSIS — E78.2 MIXED HYPERLIPIDEMIA: Primary | ICD-10-CM

## 2023-05-24 DIAGNOSIS — E78.2 MIXED HYPERLIPIDEMIA: ICD-10-CM

## 2023-05-24 LAB
25(OH)D3+25(OH)D2 SERPL-MCNC: 22 NG/ML (ref 30–96)
ALBUMIN SERPL BCP-MCNC: 4.6 G/DL (ref 3.5–5.2)
ALP SERPL-CCNC: 54 U/L (ref 55–135)
ALT SERPL W/O P-5'-P-CCNC: 61 U/L (ref 10–44)
ANION GAP SERPL CALC-SCNC: 10 MMOL/L (ref 8–16)
AST SERPL-CCNC: 27 U/L (ref 10–40)
BASOPHILS # BLD AUTO: 0.04 K/UL (ref 0–0.2)
BASOPHILS NFR BLD: 0.6 % (ref 0–1.9)
BILIRUB SERPL-MCNC: 0.8 MG/DL (ref 0.1–1)
BUN SERPL-MCNC: 16 MG/DL (ref 6–20)
CALCIUM SERPL-MCNC: 9.2 MG/DL (ref 8.7–10.5)
CHLORIDE SERPL-SCNC: 106 MMOL/L (ref 95–110)
CHOLEST SERPL-MCNC: 180 MG/DL (ref 120–199)
CHOLEST/HDLC SERPL: 5 {RATIO} (ref 2–5)
CO2 SERPL-SCNC: 24 MMOL/L (ref 23–29)
CREAT SERPL-MCNC: 1.1 MG/DL (ref 0.5–1.4)
DIFFERENTIAL METHOD: ABNORMAL
EOSINOPHIL # BLD AUTO: 0.2 K/UL (ref 0–0.5)
EOSINOPHIL NFR BLD: 2.6 % (ref 0–8)
ERYTHROCYTE [DISTWIDTH] IN BLOOD BY AUTOMATED COUNT: 11.8 % (ref 11.5–14.5)
EST. GFR  (NO RACE VARIABLE): >60 ML/MIN/1.73 M^2
GLUCOSE SERPL-MCNC: 96 MG/DL (ref 70–110)
HCT VFR BLD AUTO: 45.6 % (ref 40–54)
HDLC SERPL-MCNC: 36 MG/DL (ref 40–75)
HDLC SERPL: 20 % (ref 20–50)
HGB BLD-MCNC: 15.7 G/DL (ref 14–18)
IMM GRANULOCYTES # BLD AUTO: 0.02 K/UL (ref 0–0.04)
IMM GRANULOCYTES NFR BLD AUTO: 0.3 % (ref 0–0.5)
IRON SERPL-MCNC: 119 UG/DL (ref 45–160)
LDLC SERPL CALC-MCNC: 111 MG/DL (ref 63–159)
LYMPHOCYTES # BLD AUTO: 2.2 K/UL (ref 1–4.8)
LYMPHOCYTES NFR BLD: 32.1 % (ref 18–48)
MCH RBC QN AUTO: 30.6 PG (ref 27–31)
MCHC RBC AUTO-ENTMCNC: 34.4 G/DL (ref 32–36)
MCV RBC AUTO: 89 FL (ref 82–98)
MONOCYTES # BLD AUTO: 0.6 K/UL (ref 0.3–1)
MONOCYTES NFR BLD: 9.2 % (ref 4–15)
NEUTROPHILS # BLD AUTO: 3.8 K/UL (ref 1.8–7.7)
NEUTROPHILS NFR BLD: 55.2 % (ref 38–73)
NONHDLC SERPL-MCNC: 144 MG/DL
NRBC BLD-RTO: 0 /100 WBC
PLATELET # BLD AUTO: 300 K/UL (ref 150–450)
PMV BLD AUTO: 8.9 FL (ref 9.2–12.9)
POTASSIUM SERPL-SCNC: 4.2 MMOL/L (ref 3.5–5.1)
PROT SERPL-MCNC: 7.6 G/DL (ref 6–8.4)
RBC # BLD AUTO: 5.13 M/UL (ref 4.6–6.2)
SATURATED IRON: 29 % (ref 20–50)
SODIUM SERPL-SCNC: 140 MMOL/L (ref 136–145)
TOTAL IRON BINDING CAPACITY: 410 UG/DL (ref 250–450)
TRANSFERRIN SERPL-MCNC: 277 MG/DL (ref 200–375)
TRIGL SERPL-MCNC: 165 MG/DL (ref 30–150)
TSH SERPL DL<=0.005 MIU/L-ACNC: 0.9 UIU/ML (ref 0.4–4)
WBC # BLD AUTO: 6.95 K/UL (ref 3.9–12.7)

## 2023-05-24 PROCEDURE — 84443 ASSAY THYROID STIM HORMONE: CPT | Performed by: NURSE PRACTITIONER

## 2023-05-24 PROCEDURE — 85025 COMPLETE CBC W/AUTO DIFF WBC: CPT | Performed by: NURSE PRACTITIONER

## 2023-05-24 PROCEDURE — 80053 COMPREHEN METABOLIC PANEL: CPT | Performed by: NURSE PRACTITIONER

## 2023-05-24 PROCEDURE — 36415 COLL VENOUS BLD VENIPUNCTURE: CPT | Performed by: NURSE PRACTITIONER

## 2023-05-24 PROCEDURE — 84466 ASSAY OF TRANSFERRIN: CPT | Performed by: NURSE PRACTITIONER

## 2023-05-24 PROCEDURE — 82306 VITAMIN D 25 HYDROXY: CPT | Performed by: NURSE PRACTITIONER

## 2023-05-24 PROCEDURE — 80061 LIPID PANEL: CPT | Performed by: NURSE PRACTITIONER

## 2023-05-31 ENCOUNTER — TELEPHONE (OUTPATIENT)
Dept: INTERNAL MEDICINE | Facility: CLINIC | Age: 43
End: 2023-05-31
Payer: COMMERCIAL

## 2023-05-31 RX ORDER — ROSUVASTATIN CALCIUM 10 MG/1
10 TABLET, COATED ORAL NIGHTLY
Qty: 30 TABLET | Refills: 5 | Status: SHIPPED | OUTPATIENT
Start: 2023-05-31 | End: 2023-10-11

## 2023-05-31 NOTE — TELEPHONE ENCOUNTER
Message    Test results on the cholesterol are still high? I really feel like my diet and daily activities are not a pet causing this. Do we need to increase the dosage for the cholesterol medicine to lower the bad and increase the good? If I eat anymore chicken and fish I will have feathers and fins.    Please advise  Thanks!

## 2023-07-12 ENCOUNTER — OFFICE VISIT (OUTPATIENT)
Dept: FAMILY MEDICINE | Facility: CLINIC | Age: 43
End: 2023-07-12
Payer: COMMERCIAL

## 2023-07-12 VITALS
HEIGHT: 69 IN | DIASTOLIC BLOOD PRESSURE: 82 MMHG | SYSTOLIC BLOOD PRESSURE: 118 MMHG | WEIGHT: 175.5 LBS | HEART RATE: 79 BPM | RESPIRATION RATE: 16 BRPM | BODY MASS INDEX: 26 KG/M2 | OXYGEN SATURATION: 98 %

## 2023-07-12 DIAGNOSIS — R19.04 LEFT LOWER QUADRANT ABDOMINAL MASS: Primary | ICD-10-CM

## 2023-07-12 PROCEDURE — 3079F DIAST BP 80-89 MM HG: CPT | Mod: CPTII,S$GLB,, | Performed by: FAMILY MEDICINE

## 2023-07-12 PROCEDURE — 3074F SYST BP LT 130 MM HG: CPT | Mod: CPTII,S$GLB,, | Performed by: FAMILY MEDICINE

## 2023-07-12 PROCEDURE — 99212 PR OFFICE/OUTPT VISIT, EST, LEVL II, 10-19 MIN: ICD-10-PCS | Mod: S$GLB,,, | Performed by: FAMILY MEDICINE

## 2023-07-12 PROCEDURE — 3008F PR BODY MASS INDEX (BMI) DOCUMENTED: ICD-10-PCS | Mod: CPTII,S$GLB,, | Performed by: FAMILY MEDICINE

## 2023-07-12 PROCEDURE — 1159F MED LIST DOCD IN RCRD: CPT | Mod: CPTII,S$GLB,, | Performed by: FAMILY MEDICINE

## 2023-07-12 PROCEDURE — 99999 PR PBB SHADOW E&M-EST. PATIENT-LVL III: ICD-10-PCS | Mod: PBBFAC,,, | Performed by: FAMILY MEDICINE

## 2023-07-12 PROCEDURE — 3074F PR MOST RECENT SYSTOLIC BLOOD PRESSURE < 130 MM HG: ICD-10-PCS | Mod: CPTII,S$GLB,, | Performed by: FAMILY MEDICINE

## 2023-07-12 PROCEDURE — 1159F PR MEDICATION LIST DOCUMENTED IN MEDICAL RECORD: ICD-10-PCS | Mod: CPTII,S$GLB,, | Performed by: FAMILY MEDICINE

## 2023-07-12 PROCEDURE — 99212 OFFICE O/P EST SF 10 MIN: CPT | Mod: S$GLB,,, | Performed by: FAMILY MEDICINE

## 2023-07-12 PROCEDURE — 3008F BODY MASS INDEX DOCD: CPT | Mod: CPTII,S$GLB,, | Performed by: FAMILY MEDICINE

## 2023-07-12 PROCEDURE — 99999 PR PBB SHADOW E&M-EST. PATIENT-LVL III: CPT | Mod: PBBFAC,,, | Performed by: FAMILY MEDICINE

## 2023-07-12 PROCEDURE — 3079F PR MOST RECENT DIASTOLIC BLOOD PRESSURE 80-89 MM HG: ICD-10-PCS | Mod: CPTII,S$GLB,, | Performed by: FAMILY MEDICINE

## 2023-07-12 NOTE — PROGRESS NOTES
Subjective:       Patient ID: Hernando Roman is a 42 y.o. male.    Chief Complaint: Mass (Pt states he has small lump on his lower left side)    Pt is a 42 y.o. male who presents for evaluation and management of   Encounter Diagnosis   Name Primary?    Left lower quadrant abdominal mass Yes   .works out a lot   Noticed a palpable lump in the LLQ recently     Doing well on current meds. Denies any side effects. Prevention is up to date.    Review of Systems   Constitutional:  Negative for appetite change, chills, fever and unexpected weight change.   Gastrointestinal:  Negative for abdominal pain, blood in stool, constipation, diarrhea and vomiting.     Objective:      Physical Exam  Constitutional:       Appearance: Normal appearance. He is well-developed. He is not ill-appearing.   HENT:      Head: Normocephalic and atraumatic.      Right Ear: External ear normal.      Left Ear: External ear normal.      Nose: Nose normal.      Mouth/Throat:      Mouth: Mucous membranes are moist.      Pharynx: No oropharyngeal exudate or posterior oropharyngeal erythema.   Eyes:      General: No scleral icterus.        Right eye: No discharge.         Left eye: No discharge.      Conjunctiva/sclera: Conjunctivae normal.      Pupils: Pupils are equal, round, and reactive to light.   Neck:      Thyroid: No thyromegaly.      Vascular: No JVD.      Trachea: No tracheal deviation.   Cardiovascular:      Rate and Rhythm: Normal rate and regular rhythm.      Heart sounds: Normal heart sounds. No murmur heard.  Pulmonary:      Effort: Pulmonary effort is normal. No respiratory distress.      Breath sounds: Normal breath sounds. No wheezing or rales.   Chest:      Chest wall: No tenderness.   Abdominal:      General: Bowel sounds are normal. There is no distension.      Palpations: Abdomen is soft. There is no mass.      Tenderness: There is no abdominal tenderness. There is no guarding or rebound.   Musculoskeletal:         General: No  tenderness. Normal range of motion.      Cervical back: Normal range of motion and neck supple.      Right lower leg: No edema.      Left lower leg: No edema.      Comments: He has a palpable linear induration of the left oblique muscle of the LLQ. It is not palpable when stretching the muscle (rotating to the right) and is palpable when nathaniel the muscle (rotating to the left). It feels like scar tissue      Lymphadenopathy:      Cervical: No cervical adenopathy.   Skin:     General: Skin is warm and dry.   Neurological:      Mental Status: He is alert and oriented to person, place, and time.      Cranial Nerves: No cranial nerve deficit.      Motor: No abnormal muscle tone.      Coordination: Coordination normal.      Deep Tendon Reflexes: Reflexes are normal and symmetric. Reflexes normal.   Psychiatric:         Behavior: Behavior normal.         Thought Content: Thought content normal.         Judgment: Judgment normal.       Assessment:       1. Left lower quadrant abdominal mass        Plan:   1. Left lower quadrant abdominal mass    Feels like scar tissue in the oblique muscle   Pt reassured   RTC if condition acutely worsens or any other concerns, otherwise RTC as scheduled    No follow-ups on file.

## 2023-10-11 ENCOUNTER — OFFICE VISIT (OUTPATIENT)
Dept: FAMILY MEDICINE | Facility: CLINIC | Age: 43
End: 2023-10-11
Payer: COMMERCIAL

## 2023-10-11 VITALS
DIASTOLIC BLOOD PRESSURE: 80 MMHG | SYSTOLIC BLOOD PRESSURE: 122 MMHG | OXYGEN SATURATION: 96 % | HEIGHT: 69 IN | WEIGHT: 180.56 LBS | HEART RATE: 89 BPM | RESPIRATION RATE: 16 BRPM | BODY MASS INDEX: 26.74 KG/M2

## 2023-10-11 DIAGNOSIS — K64.9 ACUTE HEMORRHOID: Primary | ICD-10-CM

## 2023-10-11 DIAGNOSIS — K76.0 FATTY LIVER: ICD-10-CM

## 2023-10-11 DIAGNOSIS — E78.5 DYSLIPIDEMIA: ICD-10-CM

## 2023-10-11 PROCEDURE — 3079F DIAST BP 80-89 MM HG: CPT | Mod: CPTII,S$GLB,, | Performed by: FAMILY MEDICINE

## 2023-10-11 PROCEDURE — 3079F PR MOST RECENT DIASTOLIC BLOOD PRESSURE 80-89 MM HG: ICD-10-PCS | Mod: CPTII,S$GLB,, | Performed by: FAMILY MEDICINE

## 2023-10-11 PROCEDURE — 99999 PR PBB SHADOW E&M-EST. PATIENT-LVL III: CPT | Mod: PBBFAC,,, | Performed by: FAMILY MEDICINE

## 2023-10-11 PROCEDURE — 3008F PR BODY MASS INDEX (BMI) DOCUMENTED: ICD-10-PCS | Mod: CPTII,S$GLB,, | Performed by: FAMILY MEDICINE

## 2023-10-11 PROCEDURE — 99999 PR PBB SHADOW E&M-EST. PATIENT-LVL III: ICD-10-PCS | Mod: PBBFAC,,, | Performed by: FAMILY MEDICINE

## 2023-10-11 PROCEDURE — 1160F RVW MEDS BY RX/DR IN RCRD: CPT | Mod: CPTII,S$GLB,, | Performed by: FAMILY MEDICINE

## 2023-10-11 PROCEDURE — 46083 PR INCISE EXTERNAL HEMORRHOID: ICD-10-PCS | Mod: S$GLB,,, | Performed by: FAMILY MEDICINE

## 2023-10-11 PROCEDURE — 1160F PR REVIEW ALL MEDS BY PRESCRIBER/CLIN PHARMACIST DOCUMENTED: ICD-10-PCS | Mod: CPTII,S$GLB,, | Performed by: FAMILY MEDICINE

## 2023-10-11 PROCEDURE — 3074F SYST BP LT 130 MM HG: CPT | Mod: CPTII,S$GLB,, | Performed by: FAMILY MEDICINE

## 2023-10-11 PROCEDURE — 3074F PR MOST RECENT SYSTOLIC BLOOD PRESSURE < 130 MM HG: ICD-10-PCS | Mod: CPTII,S$GLB,, | Performed by: FAMILY MEDICINE

## 2023-10-11 PROCEDURE — 99214 PR OFFICE/OUTPT VISIT, EST, LEVL IV, 30-39 MIN: ICD-10-PCS | Mod: 25,S$GLB,, | Performed by: FAMILY MEDICINE

## 2023-10-11 PROCEDURE — 1159F PR MEDICATION LIST DOCUMENTED IN MEDICAL RECORD: ICD-10-PCS | Mod: CPTII,S$GLB,, | Performed by: FAMILY MEDICINE

## 2023-10-11 PROCEDURE — 46083 INC THROMBOSED HROID XTRNL: CPT | Mod: S$GLB,,, | Performed by: FAMILY MEDICINE

## 2023-10-11 PROCEDURE — 3008F BODY MASS INDEX DOCD: CPT | Mod: CPTII,S$GLB,, | Performed by: FAMILY MEDICINE

## 2023-10-11 PROCEDURE — 1159F MED LIST DOCD IN RCRD: CPT | Mod: CPTII,S$GLB,, | Performed by: FAMILY MEDICINE

## 2023-10-11 PROCEDURE — 99214 OFFICE O/P EST MOD 30 MIN: CPT | Mod: 25,S$GLB,, | Performed by: FAMILY MEDICINE

## 2023-10-11 RX ORDER — HYDROCODONE BITARTRATE AND ACETAMINOPHEN 7.5; 325 MG/1; MG/1
1 TABLET ORAL EVERY 6 HOURS PRN
Qty: 10 TABLET | Refills: 0 | Status: SHIPPED | OUTPATIENT
Start: 2023-10-11 | End: 2023-10-17 | Stop reason: SDUPTHER

## 2023-10-11 RX ORDER — ROSUVASTATIN CALCIUM 5 MG/1
5 TABLET, COATED ORAL NIGHTLY
Qty: 30 TABLET | Refills: 5 | Status: SHIPPED | OUTPATIENT
Start: 2023-10-11 | End: 2024-03-27

## 2023-10-11 RX ORDER — HYDROCORTISONE ACETATE 25 MG/1
25 SUPPOSITORY RECTAL 2 TIMES DAILY
Qty: 20 SUPPOSITORY | Refills: 0 | Status: SHIPPED | OUTPATIENT
Start: 2023-10-11 | End: 2023-10-21

## 2023-10-12 NOTE — PROGRESS NOTES
Subjective:       Patient ID: Hernando Roman is a 43 y.o. male.    Chief Complaint: Hemorrhoids (Pt states he has a hemorrhoid /He states it swollen and irritated )    Pt is a 43 y.o. male who presents for evaluation and management of   Encounter Diagnoses   Name Primary?    Acute hemorrhoid Yes    Dyslipidemia     Fatty liver    .  Doing well on current meds. Denies any side effects. Prevention is up to date.  Review of Systems   Constitutional:  Negative for activity change and unexpected weight change.   HENT:  Negative for hearing loss, rhinorrhea and trouble swallowing.    Eyes:  Negative for discharge and visual disturbance.   Respiratory:  Negative for chest tightness and wheezing.    Cardiovascular:  Negative for chest pain and palpitations.   Gastrointestinal:  Negative for blood in stool, constipation, diarrhea and vomiting.   Endocrine: Negative for polydipsia and polyuria.   Genitourinary:  Negative for difficulty urinating, hematuria and urgency.   Musculoskeletal:  Negative for arthralgias, joint swelling and neck pain.   Neurological:  Negative for weakness and headaches.   Psychiatric/Behavioral:  Negative for confusion and dysphoric mood.        Objective:      Physical Exam  Constitutional:       Appearance: Normal appearance. He is well-developed. He is not ill-appearing.   HENT:      Head: Normocephalic and atraumatic.      Right Ear: External ear normal.      Left Ear: External ear normal.      Nose: Nose normal.      Mouth/Throat:      Mouth: Mucous membranes are moist.      Pharynx: No oropharyngeal exudate or posterior oropharyngeal erythema.   Eyes:      General: No scleral icterus.        Right eye: No discharge.         Left eye: No discharge.      Conjunctiva/sclera: Conjunctivae normal.      Pupils: Pupils are equal, round, and reactive to light.   Neck:      Thyroid: No thyromegaly.      Vascular: No JVD.      Trachea: No tracheal deviation.   Cardiovascular:      Rate and Rhythm:  Normal rate and regular rhythm.      Heart sounds: Normal heart sounds. No murmur heard.  Pulmonary:      Effort: Pulmonary effort is normal. No respiratory distress.      Breath sounds: Normal breath sounds. No wheezing or rales.   Chest:      Chest wall: No tenderness.   Abdominal:      General: Bowel sounds are normal. There is no distension.      Palpations: Abdomen is soft. There is no mass.      Tenderness: There is no abdominal tenderness. There is no guarding or rebound.   Genitourinary:     Comments: He has a large thrombosed external hemorrhoid at 9 o clock in the anatomical position   Musculoskeletal:         General: Normal range of motion.      Cervical back: Normal range of motion and neck supple.      Right lower leg: No edema.      Left lower leg: No edema.   Lymphadenopathy:      Cervical: No cervical adenopathy.   Skin:     General: Skin is warm and dry.   Neurological:      Mental Status: He is alert and oriented to person, place, and time.      Cranial Nerves: No cranial nerve deficit.      Motor: No abnormal muscle tone.      Coordination: Coordination normal.      Deep Tendon Reflexes: Reflexes are normal and symmetric. Reflexes normal.   Psychiatric:         Behavior: Behavior normal.         Thought Content: Thought content normal.         Judgment: Judgment normal.         Assessment:       1. Acute hemorrhoid    2. Dyslipidemia    3. Fatty liver        Plan:   1. Acute hemorrhoid  -     HYDROcodone-acetaminophen (NORCO) 7.5-325 mg per tablet; Take 1 tablet by mouth every 6 (six) hours as needed for Pain.  Dispense: 10 tablet; Refill: 0  -     hydrocortisone (ANUSOL-HC) 25 mg suppository; Place 1 suppository (25 mg total) rectally 2 (two) times daily. for 10 days  Dispense: 20 suppository; Refill: 0    2. Dyslipidemia  -     rosuvastatin (CRESTOR) 5 MG tablet; Take 1 tablet (5 mg total) by mouth every evening.  Dispense: 30 tablet; Refill: 5    3. Fatty liver  Overview:  The patient is asked  to make an attempt to improve diet and exercise patterns to aid in medical management of this problem.  Cut out white carbs: bread, rice, pasta, potatoes, beer. Exercise/walk 5x/week for at least 30 minutes  .        Area prepped and draped in sterile fashion. After consent for procedure performed, 4ml lidocaine 1% with epinephrine used for anesthesia. incised  Lesion with 11 blade making a 1cm incision.  About 2 cubic cm of thrombotic material evacuated. RTC with any worsening of s/s or no improvement in condition. Otherwise RTC as planned or prn.     No follow-ups on file.

## 2023-10-17 DIAGNOSIS — K64.9 ACUTE HEMORRHOID: ICD-10-CM

## 2023-10-17 RX ORDER — HYDROCODONE BITARTRATE AND ACETAMINOPHEN 7.5; 325 MG/1; MG/1
1 TABLET ORAL EVERY 6 HOURS PRN
Qty: 6 TABLET | Refills: 0 | Status: SHIPPED | OUTPATIENT
Start: 2023-10-17

## 2023-10-17 NOTE — TELEPHONE ENCOUNTER
No care due was identified.  [facility] Embedded Care Due Messages. Reference number: [id number].   10/17/2023 2:07:45 PM CDT

## 2024-03-21 ENCOUNTER — TELEPHONE (OUTPATIENT)
Dept: PHARMACY | Facility: CLINIC | Age: 44
End: 2024-03-21
Payer: COMMERCIAL

## 2024-03-26 DIAGNOSIS — E78.5 DYSLIPIDEMIA: ICD-10-CM

## 2024-03-26 NOTE — TELEPHONE ENCOUNTER
Care Due:                  Date            Visit Type   Department     Provider  --------------------------------------------------------------------------------                                MYCHART                              FOLLOWUP/OF  [facility]  Last Visit: 10-      FICE VISIT   MEDICINE       Merlin Quintero  Next Visit: None Scheduled  None         None Found                                                            Last  Test          Frequency    Reason                     Performed    Due Date  --------------------------------------------------------------------------------    CMP.........  12 months..  rosuvastatin.............  05- 05-    Lipid Panel.  12 months..  rosuvastatin.............  05- 05-    [employer] Embedded Care Due Messages. Reference number: [id number].   3/26/2024 2:39:00 PM CDT

## 2024-03-27 RX ORDER — ROSUVASTATIN CALCIUM 5 MG/1
5 TABLET, COATED ORAL NIGHTLY
Qty: 30 TABLET | Refills: 0 | Status: SHIPPED | OUTPATIENT
Start: 2024-03-27 | End: 2024-05-07

## 2024-03-27 NOTE — TELEPHONE ENCOUNTER
Provider Staff:  Action required for this patient    Requires labs      Please see care gap opportunities below in Care Due Message.    Thanks!  [facility]     Appointments      Date Provider   Last Visit   10/11/2023 Merlin Quintero MD   Next Visit   Visit date not found Merlin Quintero MD     Refill Decision Note   Hernando Roman  is requesting a refill authorization.  Brief Assessment and Rationale for Refill:  Approve     Medication Therapy Plan:         Comments:     Note composed:12:40 PM 03/27/2024

## 2024-05-06 DIAGNOSIS — E78.5 DYSLIPIDEMIA: ICD-10-CM

## 2024-05-06 NOTE — TELEPHONE ENCOUNTER
No care due was identified.  [facility] Embedded Care Due Messages. Reference number: [id number].   5/06/2024 5:06:43 PM CDT

## 2024-05-07 RX ORDER — ROSUVASTATIN CALCIUM 5 MG/1
5 TABLET, COATED ORAL NIGHTLY
Qty: 90 TABLET | Refills: 0 | Status: SHIPPED | OUTPATIENT
Start: 2024-05-07

## 2024-05-07 NOTE — TELEPHONE ENCOUNTER
Refill Decision Note   Hernando Roman  is requesting a refill authorization.  Brief Assessment and Rationale for Refill:  Approve     Medication Therapy Plan:         Comments:     Note composed:3:24 AM 05/07/2024

## 2024-05-31 ENCOUNTER — OFFICE VISIT (OUTPATIENT)
Dept: INTERNAL MEDICINE | Facility: CLINIC | Age: 44
End: 2024-05-31
Payer: COMMERCIAL

## 2024-05-31 VITALS
HEIGHT: 70 IN | DIASTOLIC BLOOD PRESSURE: 78 MMHG | HEART RATE: 62 BPM | BODY MASS INDEX: 25.5 KG/M2 | WEIGHT: 178.13 LBS | RESPIRATION RATE: 18 BRPM | OXYGEN SATURATION: 98 % | SYSTOLIC BLOOD PRESSURE: 124 MMHG

## 2024-05-31 DIAGNOSIS — E55.9 VITAMIN D DEFICIENCY: ICD-10-CM

## 2024-05-31 DIAGNOSIS — Z12.5 PROSTATE CANCER SCREENING: ICD-10-CM

## 2024-05-31 DIAGNOSIS — E78.2 MIXED HYPERLIPIDEMIA: Primary | ICD-10-CM

## 2024-05-31 LAB
25(OH)D3+25(OH)D2 SERPL-MCNC: 27 NG/ML (ref 30–96)
ALBUMIN SERPL BCP-MCNC: 4.3 G/DL (ref 3.5–5.2)
ALP SERPL-CCNC: 59 U/L (ref 55–135)
ALT SERPL W/O P-5'-P-CCNC: 50 U/L (ref 10–44)
ANION GAP SERPL CALC-SCNC: 6 MMOL/L (ref 8–16)
AST SERPL-CCNC: 28 U/L (ref 10–40)
BASOPHILS # BLD AUTO: 0.03 K/UL (ref 0–0.2)
BASOPHILS NFR BLD: 0.5 % (ref 0–1.9)
BILIRUB SERPL-MCNC: 0.6 MG/DL (ref 0.1–1)
BUN SERPL-MCNC: 14 MG/DL (ref 6–20)
CALCIUM SERPL-MCNC: 9.5 MG/DL (ref 8.7–10.5)
CHLORIDE SERPL-SCNC: 106 MMOL/L (ref 95–110)
CHOLEST SERPL-MCNC: 158 MG/DL (ref 120–199)
CHOLEST/HDLC SERPL: 4.8 {RATIO} (ref 2–5)
CO2 SERPL-SCNC: 26 MMOL/L (ref 23–29)
COMPLEXED PSA SERPL-MCNC: 0.84 NG/ML (ref 0–4)
CREAT SERPL-MCNC: 1.2 MG/DL (ref 0.5–1.4)
DIFFERENTIAL METHOD BLD: NORMAL
EOSINOPHIL # BLD AUTO: 0.2 K/UL (ref 0–0.5)
EOSINOPHIL NFR BLD: 2.5 % (ref 0–8)
ERYTHROCYTE [DISTWIDTH] IN BLOOD BY AUTOMATED COUNT: 11.6 % (ref 11.5–14.5)
EST. GFR  (NO RACE VARIABLE): >60 ML/MIN/1.73 M^2
GLUCOSE SERPL-MCNC: 97 MG/DL (ref 70–110)
HCT VFR BLD AUTO: 46.4 % (ref 40–54)
HDLC SERPL-MCNC: 33 MG/DL (ref 40–75)
HDLC SERPL: 20.9 % (ref 20–50)
HGB BLD-MCNC: 15.8 G/DL (ref 14–18)
IMM GRANULOCYTES # BLD AUTO: 0.01 K/UL (ref 0–0.04)
IMM GRANULOCYTES NFR BLD AUTO: 0.2 % (ref 0–0.5)
LDLC SERPL CALC-MCNC: 94.8 MG/DL (ref 63–159)
LYMPHOCYTES # BLD AUTO: 2.2 K/UL (ref 1–4.8)
LYMPHOCYTES NFR BLD: 35 % (ref 18–48)
MCH RBC QN AUTO: 30.8 PG (ref 27–31)
MCHC RBC AUTO-ENTMCNC: 34.1 G/DL (ref 32–36)
MCV RBC AUTO: 90 FL (ref 82–98)
MONOCYTES # BLD AUTO: 0.5 K/UL (ref 0.3–1)
MONOCYTES NFR BLD: 7.2 % (ref 4–15)
NEUTROPHILS # BLD AUTO: 3.5 K/UL (ref 1.8–7.7)
NEUTROPHILS NFR BLD: 54.6 % (ref 38–73)
NONHDLC SERPL-MCNC: 125 MG/DL
NRBC BLD-RTO: 0 /100 WBC
PLATELET # BLD AUTO: 348 K/UL (ref 150–450)
PMV BLD AUTO: 9.6 FL (ref 9.2–12.9)
POTASSIUM SERPL-SCNC: 4.2 MMOL/L (ref 3.5–5.1)
PROT SERPL-MCNC: 7.5 G/DL (ref 6–8.4)
RBC # BLD AUTO: 5.13 M/UL (ref 4.6–6.2)
SODIUM SERPL-SCNC: 138 MMOL/L (ref 136–145)
TRIGL SERPL-MCNC: 151 MG/DL (ref 30–150)
TSH SERPL DL<=0.005 MIU/L-ACNC: 0.79 UIU/ML (ref 0.4–4)
WBC # BLD AUTO: 6.37 K/UL (ref 3.9–12.7)

## 2024-05-31 PROCEDURE — 84153 ASSAY OF PSA TOTAL: CPT | Performed by: NURSE PRACTITIONER

## 2024-05-31 PROCEDURE — 3074F SYST BP LT 130 MM HG: CPT | Mod: CPTII,S$GLB,, | Performed by: NURSE PRACTITIONER

## 2024-05-31 PROCEDURE — 1159F MED LIST DOCD IN RCRD: CPT | Mod: CPTII,S$GLB,, | Performed by: NURSE PRACTITIONER

## 2024-05-31 PROCEDURE — 36415 COLL VENOUS BLD VENIPUNCTURE: CPT | Performed by: NURSE PRACTITIONER

## 2024-05-31 PROCEDURE — 80061 LIPID PANEL: CPT | Performed by: NURSE PRACTITIONER

## 2024-05-31 PROCEDURE — 3008F BODY MASS INDEX DOCD: CPT | Mod: CPTII,S$GLB,, | Performed by: NURSE PRACTITIONER

## 2024-05-31 PROCEDURE — 80053 COMPREHEN METABOLIC PANEL: CPT | Performed by: NURSE PRACTITIONER

## 2024-05-31 PROCEDURE — 85025 COMPLETE CBC W/AUTO DIFF WBC: CPT | Performed by: NURSE PRACTITIONER

## 2024-05-31 PROCEDURE — 82306 VITAMIN D 25 HYDROXY: CPT | Performed by: NURSE PRACTITIONER

## 2024-05-31 PROCEDURE — 99999 PR PBB SHADOW E&M-EST. PATIENT-LVL III: CPT | Mod: PBBFAC,,, | Performed by: NURSE PRACTITIONER

## 2024-05-31 PROCEDURE — 84443 ASSAY THYROID STIM HORMONE: CPT | Performed by: NURSE PRACTITIONER

## 2024-05-31 PROCEDURE — 1160F RVW MEDS BY RX/DR IN RCRD: CPT | Mod: CPTII,S$GLB,, | Performed by: NURSE PRACTITIONER

## 2024-05-31 PROCEDURE — 3078F DIAST BP <80 MM HG: CPT | Mod: CPTII,S$GLB,, | Performed by: NURSE PRACTITIONER

## 2024-05-31 PROCEDURE — 99214 OFFICE O/P EST MOD 30 MIN: CPT | Mod: S$GLB,,, | Performed by: NURSE PRACTITIONER

## 2024-05-31 NOTE — PROGRESS NOTES
Subjective:       Patient ID: Hernando Roman is a 43 y.o. male.    Chief Complaint: Follow-up (Annual check up - needs )    Patient is known, to me and presents with   Chief Complaint   Patient presents with    Follow-up     Annual check up - needs    .  Denies chest pain and shortness of breath.  Patient presents with annual check up and needs blood work. States that he is unable to take vitamins due to nausea and vomiting. He has tried multiple ways to take it but is unable to. He eats healthy and works out.     Follow-up  Pertinent negatives include no abdominal pain, arthralgias, chest pain, chills, congestion, coughing, diaphoresis, fatigue, fever, headaches, joint swelling, myalgias, nausea, neck pain, numbness, rash, sore throat, vomiting or weakness.     Review of Systems   Constitutional: Negative.  Negative for activity change, appetite change, chills, diaphoresis, fatigue, fever and unexpected weight change.   HENT: Negative.  Negative for congestion, ear discharge, ear pain, facial swelling, hearing loss, nosebleeds, postnasal drip, rhinorrhea, sinus pressure, sneezing, sore throat, tinnitus, trouble swallowing and voice change.    Eyes: Negative.  Negative for photophobia, pain, discharge, redness, itching and visual disturbance.   Respiratory: Negative.  Negative for apnea, cough, choking, chest tightness, shortness of breath, wheezing and stridor.    Cardiovascular: Negative.  Negative for chest pain, palpitations and leg swelling.   Gastrointestinal:  Negative for abdominal distention, abdominal pain, anal bleeding, blood in stool, constipation, diarrhea, nausea and vomiting.   Genitourinary: Negative.  Negative for difficulty urinating, dysuria, enuresis, flank pain, frequency, hematuria, penile discharge, penile pain, penile swelling, scrotal swelling, testicular pain and urgency.   Musculoskeletal: Negative.  Negative for arthralgias, back pain, gait problem, joint swelling, myalgias, neck  pain and neck stiffness.   Skin: Negative.  Negative for color change, pallor, rash and wound.   Neurological:  Negative for dizziness, tremors, seizures, syncope, facial asymmetry, speech difficulty, weakness, light-headedness, numbness and headaches.   Hematological:  Negative for adenopathy. Does not bruise/bleed easily.   Psychiatric/Behavioral: Negative.  Negative for agitation, dysphoric mood, sleep disturbance and suicidal ideas. The patient is not nervous/anxious.        Objective:      Physical Exam  Vitals and nursing note reviewed.   Constitutional:       General: He is not in acute distress.     Appearance: He is well-developed. He is not diaphoretic.   HENT:      Head: Normocephalic and atraumatic.      Right Ear: External ear normal.      Left Ear: External ear normal.      Nose: Nose normal.      Mouth/Throat:      Pharynx: No oropharyngeal exudate.   Eyes:      General:         Right eye: No discharge.         Left eye: No discharge.      Conjunctiva/sclera: Conjunctivae normal.      Pupils: Pupils are equal, round, and reactive to light.   Neck:      Thyroid: No thyromegaly.      Vascular: No JVD.      Trachea: No tracheal deviation.   Cardiovascular:      Rate and Rhythm: Normal rate and regular rhythm.      Heart sounds: Normal heart sounds. No murmur heard.     No friction rub. No gallop.   Pulmonary:      Effort: Pulmonary effort is normal. No respiratory distress.      Breath sounds: Normal breath sounds. No stridor. No wheezing or rales.   Chest:      Chest wall: No tenderness.   Abdominal:      General: Bowel sounds are normal. There is no distension.      Palpations: Abdomen is soft.      Tenderness: There is no abdominal tenderness. There is no guarding or rebound.   Musculoskeletal:         General: No tenderness. Normal range of motion.      Cervical back: Normal range of motion and neck supple.   Lymphadenopathy:      Cervical: No cervical adenopathy.   Skin:     General: Skin is warm  "and dry.      Capillary Refill: Capillary refill takes less than 2 seconds.      Coloration: Skin is not jaundiced or pale.      Findings: No bruising, erythema, lesion or rash.   Neurological:      General: No focal deficit present.      Mental Status: He is alert and oriented to person, place, and time.      Cranial Nerves: No cranial nerve deficit.      Motor: No abnormal muscle tone.      Coordination: Coordination normal.      Deep Tendon Reflexes: Reflexes are normal and symmetric. Reflexes normal.   Psychiatric:         Attention and Perception: He does not perceive auditory or visual hallucinations.         Mood and Affect: Mood and affect normal. Mood is not anxious or depressed. Affect is not tearful.         Behavior: Behavior normal.         Thought Content: Thought content normal. Thought content does not include homicidal or suicidal ideation. Thought content does not include homicidal or suicidal plan.         Judgment: Judgment normal.         Assessment:       1. Mixed hyperlipidemia    2. Vitamin D deficiency    3. Prostate cancer screening        Plan:   1. Mixed hyperlipidemia  -     CBC Auto Differential; Future; Expected date: 05/31/2024  -     Comprehensive Metabolic Panel; Future; Expected date: 05/31/2024  -     TSH; Future; Expected date: 05/31/2024  -     Lipid Panel; Future; Expected date: 05/31/2024    2. Vitamin D deficiency  -     CBC Auto Differential; Future; Expected date: 05/31/2024  -     Comprehensive Metabolic Panel; Future; Expected date: 05/31/2024  -     Vitamin D; Future; Expected date: 05/31/2024    3. Prostate cancer screening  -     PSA, Screening; Future; Expected date: 05/31/2024     "This note will not be shared with the patient."  Lab drawn today CBC, CMP, TSH, FLP  Limit the cholesterol in your diet to less than 300 mg per day.  Fats should contribute no more than 20 to 35% of your daily calories.  Less than 7 to 10% of your calories should come from saturated " fat.  Avoid saturated fat products e.g., butter, some oils, meat, and poultry fat contain a lot of saturated fat.  Check food labels for fat and cholesterol content. Choose the foods with less fat per serving.  Limit the amount of butter and margarine you eat.  Use salad dressings and margarine made with polyunsaturated and monunsaturated fats.  Use egg whites or egg substitutes rather than whole eggs.  Replace whole-milk dairy products with nonfat or low-fat mild, cheese, spreads, and yogurt.  Eat skinless chicken, turkey, fish, and meatless entrees more often than red meat.  Choose lean cuts of meat and trim off all visible fat. Keep portion sizes moderate.  Avoid fatty desserts such as ice cream, cream-filled cakes, and cheesecakes. Choose fresh fruits, nonfat frozen yogurt, Popsicles, etc.  Reduce the amount of fried foods, vending machine food, and fast food you eat.  Eat fruits and vegetables (especially fresh fruits and leafy vegetables), beans, and whole grains daily. The fiber in these foods helps lower cholesterol.  Look for low-fat or nonfat varieties of the foods you like to eat or look for substitutes.  You may need to exercise 60 minutes a day to prevent weight gain and 90 minutes a day to lose weight.  RTC in one year.

## 2024-06-06 ENCOUNTER — PATIENT MESSAGE (OUTPATIENT)
Dept: INTERNAL MEDICINE | Facility: CLINIC | Age: 44
End: 2024-06-06
Payer: COMMERCIAL

## 2024-06-06 DIAGNOSIS — R74.8 ELEVATED LIVER ENZYMES: Primary | ICD-10-CM

## 2024-06-10 ENCOUNTER — LAB VISIT (OUTPATIENT)
Dept: LAB | Facility: HOSPITAL | Age: 44
End: 2024-06-10
Attending: NURSE PRACTITIONER
Payer: COMMERCIAL

## 2024-06-10 DIAGNOSIS — R74.8 ELEVATED LIVER ENZYMES: ICD-10-CM

## 2024-06-10 PROCEDURE — 82040 ASSAY OF SERUM ALBUMIN: CPT | Performed by: NURSE PRACTITIONER

## 2024-06-10 PROCEDURE — 36415 COLL VENOUS BLD VENIPUNCTURE: CPT | Performed by: NURSE PRACTITIONER

## 2024-06-10 PROCEDURE — 84270 ASSAY OF SEX HORMONE GLOBUL: CPT | Performed by: NURSE PRACTITIONER

## 2024-06-16 LAB — ALBUMIN SERPL-MCNC: 4.7 G/DL (ref 3.6–5.1)

## 2024-06-25 ENCOUNTER — PATIENT MESSAGE (OUTPATIENT)
Dept: INTERNAL MEDICINE | Facility: CLINIC | Age: 44
End: 2024-06-25
Payer: COMMERCIAL

## 2024-07-24 ENCOUNTER — PATIENT MESSAGE (OUTPATIENT)
Dept: INTERNAL MEDICINE | Facility: CLINIC | Age: 44
End: 2024-07-24
Payer: COMMERCIAL

## 2024-07-31 ENCOUNTER — HOSPITAL ENCOUNTER (OUTPATIENT)
Dept: RADIOLOGY | Facility: HOSPITAL | Age: 44
Discharge: HOME | End: 2024-07-31
Attending: PAIN MEDICINE
Payer: COMMERCIAL

## 2024-07-31 DIAGNOSIS — M75.101 RIGHT ROTATOR CUFF TEAR: ICD-10-CM

## 2024-07-31 PROCEDURE — 73030 X-RAY EXAM OF SHOULDER: CPT | Mod: TC,RT

## 2024-07-31 PROCEDURE — 73030 X-RAY EXAM OF SHOULDER: CPT | Mod: 26,RT,, | Performed by: RADIOLOGY

## 2024-08-01 ENCOUNTER — HOSPITAL ENCOUNTER (OUTPATIENT)
Dept: RADIOLOGY | Facility: HOSPITAL | Age: 44
Discharge: HOME | End: 2024-08-01
Attending: PAIN MEDICINE
Payer: COMMERCIAL

## 2024-08-01 DIAGNOSIS — M75.101 RIGHT ROTATOR CUFF TEAR: ICD-10-CM

## 2024-08-01 PROCEDURE — 73221 MRI JOINT UPR EXTREM W/O DYE: CPT | Mod: TC,RT

## 2024-08-01 PROCEDURE — 73221 MRI JOINT UPR EXTREM W/O DYE: CPT | Mod: 26,RT,, | Performed by: RADIOLOGY

## 2024-08-08 ENCOUNTER — PATIENT MESSAGE (OUTPATIENT)
Dept: HEPATOLOGY | Facility: CLINIC | Age: 44
End: 2024-08-08
Payer: COMMERCIAL

## 2024-08-08 DIAGNOSIS — E78.2 MIXED HYPERLIPIDEMIA: ICD-10-CM

## 2024-08-08 DIAGNOSIS — E78.5 HYPERLIPIDEMIA, UNSPECIFIED HYPERLIPIDEMIA TYPE: ICD-10-CM

## 2024-08-08 DIAGNOSIS — R79.89 ELEVATED FERRITIN: ICD-10-CM

## 2024-08-08 DIAGNOSIS — R74.8 ELEVATED LIVER ENZYMES: ICD-10-CM

## 2024-08-08 DIAGNOSIS — K76.0 FATTY LIVER: Primary | ICD-10-CM

## 2024-09-18 ENCOUNTER — HOSPITAL ENCOUNTER (OUTPATIENT)
Dept: RADIOLOGY | Facility: HOSPITAL | Age: 44
Discharge: HOME | End: 2024-09-18
Attending: INTERNAL MEDICINE
Payer: COMMERCIAL

## 2024-09-18 DIAGNOSIS — R11.0 NAUSEA: ICD-10-CM

## 2024-09-18 DIAGNOSIS — R13.10 DYSPHAGIA: ICD-10-CM

## 2024-09-18 PROCEDURE — 25500020 PHARM REV CODE 255: Performed by: INTERNAL MEDICINE

## 2024-09-18 PROCEDURE — A9698 NON-RAD CONTRAST MATERIALNOC: HCPCS | Performed by: INTERNAL MEDICINE

## 2024-09-18 PROCEDURE — 74220 X-RAY XM ESOPHAGUS 1CNTRST: CPT | Mod: TC

## 2024-09-18 PROCEDURE — 74220 X-RAY XM ESOPHAGUS 1CNTRST: CPT | Mod: 26,,, | Performed by: RADIOLOGY

## 2024-09-18 RX ADMIN — BARIUM SULFATE 150 ML: 0.6 SUSPENSION ORAL at 09:09

## 2024-10-24 ENCOUNTER — TELEPHONE (OUTPATIENT)
Dept: PHARMACY | Facility: CLINIC | Age: 44
End: 2024-10-24
Payer: COMMERCIAL

## 2024-10-25 NOTE — TELEPHONE ENCOUNTER
[facility]/Population Health Chart Review & Patient Outreach Details For Medication Adherence Project    Reason for Outreach Encounter: 3rd Party payor non-compliance report ([employer], [employer], [employer], etc)  2.  Patient Outreach Method: Reviewed patient chart  and [employer] message  3.   Medication in question:   Hyperlipidemia Medications               rosuvastatin (CRESTOR) 5 MG tablet TAKE ONE TABLET BY MOUTH ONCE A DAY IN THE EVENING                  ROSUVASTATIN  last filled  6/4 for 30 day supply      4.  Reviewed and or Updates Made To: Patient Chart  5. Outreach Outcomes and/or actions taken: Sent inquiry to patient: Waiting for response  Additional Notes:

## 2024-11-12 ENCOUNTER — TELEPHONE (OUTPATIENT)
Dept: PHARMACY | Facility: CLINIC | Age: 44
End: 2024-11-12
Payer: COMMERCIAL

## 2024-11-25 DIAGNOSIS — E78.5 DYSLIPIDEMIA: ICD-10-CM

## 2024-11-26 RX ORDER — ROSUVASTATIN CALCIUM 5 MG/1
5 TABLET, COATED ORAL NIGHTLY
Qty: 90 TABLET | Refills: 0 | Status: SHIPPED | OUTPATIENT
Start: 2024-11-26

## 2024-11-26 NOTE — TELEPHONE ENCOUNTER
Refill Routing Note   Medication(s) are not appropriate for processing by [facility] for the following reason(s):        Non-participating provider    [facility] action(s):  Route               Appointments  past 12m or future 3m with PCP    Date Provider   Last Visit   5/31/2024 Michela Licea, NP   Next Visit   Visit date not found Michela Licea, NP   ED visits in past 90 days: 0        Note composed:7:41 AM 11/26/2024

## 2024-12-20 ENCOUNTER — TELEPHONE (OUTPATIENT)
Dept: PHARMACY | Facility: CLINIC | Age: 44
End: 2024-12-20
Payer: COMMERCIAL

## 2024-12-20 NOTE — TELEPHONE ENCOUNTER
[facility]/Population Health Chart Review & Patient Outreach Details For Medication Adherence Project    Reason for Outreach Encounter: 3rd Party payor non-compliance report ([employer], [employer], [employer], etc)  2.  Patient Outreach Method: Reviewed patient chart   3.   Medication in question:    Hyperlipidemia Medications               rosuvastatin (CRESTOR) 5 MG tablet TAKE ONE TABLET BY MOUTH ONCE A DAY IN THE EVENING                  rosuvastatin  last filled  11/26 for 30 day supply      4.  Reviewed and or Updates Made To: Patient Chart  5. Outreach Outcomes and/or actions taken: Patient filled medication and is on track to be adherent  Additional Notes:

## 2025-01-17 ENCOUNTER — TELEPHONE (OUTPATIENT)
Dept: PHARMACY | Facility: CLINIC | Age: 45
End: 2025-01-17
Payer: COMMERCIAL

## 2025-01-17 NOTE — TELEPHONE ENCOUNTER
[facility]/Population Health Chart Review & Patient Outreach Details For Medication Adherence Project    Reason for Outreach Encounter: 3rd Party payor non-compliance report ([employer], [employer], C, etc)  2.  Patient Outreach Method: Reviewed Patient Chart  3.   Medication in question: rosuvastatin   LAST FILLED: 1/14/25 for 30 day supply  Hyperlipidemia Medications               rosuvastatin (CRESTOR) 5 MG tablet TAKE ONE TABLET BY MOUTH ONCE A DAY IN THE EVENING               4.  Reviewed and or Updates Made To: Patient Chart  5. Outreach Outcomes and/or actions taken: Patient filled medication and is on track to be adherent

## 2025-02-14 ENCOUNTER — OFFICE VISIT (OUTPATIENT)
Dept: INTERNAL MEDICINE | Facility: CLINIC | Age: 45
End: 2025-02-14
Payer: COMMERCIAL

## 2025-02-14 VITALS
BODY MASS INDEX: 25.44 KG/M2 | HEART RATE: 84 BPM | OXYGEN SATURATION: 97 % | HEIGHT: 70 IN | SYSTOLIC BLOOD PRESSURE: 108 MMHG | WEIGHT: 177.69 LBS | RESPIRATION RATE: 20 BRPM | DIASTOLIC BLOOD PRESSURE: 72 MMHG

## 2025-02-14 DIAGNOSIS — R74.8 ELEVATED LIVER ENZYMES: ICD-10-CM

## 2025-02-14 DIAGNOSIS — Z12.5 PROSTATE CANCER SCREENING: ICD-10-CM

## 2025-02-14 DIAGNOSIS — E78.2 MIXED HYPERLIPIDEMIA: ICD-10-CM

## 2025-02-14 DIAGNOSIS — E55.9 VITAMIN D DEFICIENCY: ICD-10-CM

## 2025-02-14 DIAGNOSIS — R53.83 FATIGUE, UNSPECIFIED TYPE: ICD-10-CM

## 2025-02-14 PROCEDURE — 3008F BODY MASS INDEX DOCD: CPT | Mod: CPTII,S$GLB,, | Performed by: NURSE PRACTITIONER

## 2025-02-14 PROCEDURE — 1159F MED LIST DOCD IN RCRD: CPT | Mod: CPTII,S$GLB,, | Performed by: NURSE PRACTITIONER

## 2025-02-14 PROCEDURE — 3074F SYST BP LT 130 MM HG: CPT | Mod: CPTII,S$GLB,, | Performed by: NURSE PRACTITIONER

## 2025-02-14 PROCEDURE — 3078F DIAST BP <80 MM HG: CPT | Mod: CPTII,S$GLB,, | Performed by: NURSE PRACTITIONER

## 2025-02-14 PROCEDURE — 99999 PR PBB SHADOW E&M-EST. PATIENT-LVL III: CPT | Mod: PBBFAC,,, | Performed by: NURSE PRACTITIONER

## 2025-02-14 PROCEDURE — 99214 OFFICE O/P EST MOD 30 MIN: CPT | Mod: S$GLB,,, | Performed by: NURSE PRACTITIONER

## 2025-02-14 RX ORDER — DICLOFENAC POTASSIUM 50 MG/1
50 TABLET, FILM COATED ORAL DAILY PRN
COMMUNITY
Start: 2024-11-21

## 2025-02-14 RX ORDER — PANTOPRAZOLE SODIUM 40 MG/1
40 TABLET, DELAYED RELEASE ORAL
COMMUNITY
Start: 2024-11-21

## 2025-02-14 NOTE — PROGRESS NOTES
"Subjective     Patient ID: Hernando Roman is a 44 y.o. male.    Chief Complaint: Eye Problem (Pt here for busted blood vessel in right eye. )    Pt reports "bleeding spot" to right eye onset this morning. Denies pain, injury, or visual changes. Eye glass use in use currently. Reports compliance with routine eye exams.     Eye Problem   Associated symptoms include eye redness.   Review of Systems   Constitutional: Negative.    HENT: Negative.     Eyes:  Positive for redness. Negative for pain.   Respiratory: Negative.     Cardiovascular: Negative.    Gastrointestinal: Negative.    Endocrine: Negative.    Genitourinary: Negative.    Musculoskeletal: Negative.    Integumentary:  Negative.   Allergic/Immunologic: Negative.    Neurological: Negative.    Hematological: Negative.    Psychiatric/Behavioral: Negative.          Objective     Physical Exam  Constitutional:       Appearance: Normal appearance.   HENT:      Head: Normocephalic and atraumatic.      Nose: Nose normal.   Eyes:      General: Lids are normal. Vision grossly intact.         Right eye: No foreign body, discharge or hordeolum.         Left eye: No foreign body, discharge or hordeolum.      Extraocular Movements: Extraocular movements intact.      Conjunctiva/sclera: Conjunctivae normal.      Pupils: Pupils are equal, round, and reactive to light.        Comments: Area of superficial hemorrhagic area noted.    Cardiovascular:      Rate and Rhythm: Normal rate and regular rhythm.      Pulses: Normal pulses.      Heart sounds: Normal heart sounds.   Pulmonary:      Effort: Pulmonary effort is normal.      Breath sounds: Normal breath sounds.   Abdominal:      General: Bowel sounds are normal.      Palpations: Abdomen is soft.   Musculoskeletal:         General: Normal range of motion.      Cervical back: Normal range of motion.   Skin:     General: Skin is warm.      Capillary Refill: Capillary refill takes less than 2 seconds.   Neurological:      " General: No focal deficit present.      Mental Status: He is alert and oriented to person, place, and time. Mental status is at baseline.   Psychiatric:         Mood and Affect: Mood normal.         Behavior: Behavior normal.         Thought Content: Thought content normal.         Judgment: Judgment normal.        Assessment and Plan     1. Vitamin D deficiency  -     COMPREHENSIVE METABOLIC PANEL; Future; Expected date: 05/14/2025  -     CBC W/ AUTO DIFFERENTIAL; Future; Expected date: 05/14/2025  -     Vitamin D; Future; Expected date: 05/15/2025    2. Mixed hyperlipidemia  -     TSH; Future; Expected date: 05/14/2025  -     LIPID PANEL; Future; Expected date: 05/14/2025  -     COMPREHENSIVE METABOLIC PANEL; Future; Expected date: 05/14/2025  -     CBC W/ AUTO DIFFERENTIAL; Future; Expected date: 05/14/2025    3. Elevated liver enzymes  -     COMPREHENSIVE METABOLIC PANEL; Future; Expected date: 05/14/2025  -     CBC W/ AUTO DIFFERENTIAL; Future; Expected date: 05/14/2025    4. Fatigue, unspecified type  -     TSH; Future; Expected date: 05/14/2025  -     COMPREHENSIVE METABOLIC PANEL; Future; Expected date: 05/14/2025  -     CBC W/ AUTO DIFFERENTIAL; Future; Expected date: 05/14/2025  -     TESTOSTERONE; Future; Expected date: 05/14/2025    5. Prostate cancer screening  -     PSA, SCREENING; Future; Expected date: 05/14/2025               Follow up in about 3 months (around 5/14/2025), or if symptoms worsen or fail to improve.

## 2025-02-21 ENCOUNTER — TELEPHONE (OUTPATIENT)
Dept: PHARMACY | Facility: CLINIC | Age: 45
End: 2025-02-21
Payer: COMMERCIAL

## 2025-02-21 NOTE — TELEPHONE ENCOUNTER
[facility]/Population Health Chart Review & Patient Outreach Details For Medication Adherence Project    Reason for Outreach Encounter: 3rd Party payor non-compliance report ([employer], [employer], C, etc)  2.  Patient Outreach Method: [employer] message  3.   Medication in question: rosuvastatin    LAST FILLED: 12/24/24 for 30 day supply  Hyperlipidemia Medications              rosuvastatin (CRESTOR) 5 MG tablet TAKE ONE TABLET BY MOUTH ONCE A DAY IN THE EVENING               4.  Reviewed and or Updates Made To: Patient Chart  5. Outreach Outcomes and/or actions taken: Sent inquiry to patient: Waiting for response.

## 2025-05-08 DIAGNOSIS — E78.5 DYSLIPIDEMIA: ICD-10-CM

## 2025-05-08 RX ORDER — ROSUVASTATIN CALCIUM 5 MG/1
5 TABLET, COATED ORAL NIGHTLY
Qty: 90 TABLET | Refills: 0 | Status: SHIPPED | OUTPATIENT
Start: 2025-05-08

## (undated) DEVICE — NDL INSUF ULTRA VERESS 120MM

## (undated) DEVICE — SOL CLEARIFY VISUALIZATION LAP

## (undated) DEVICE — GLOVE PROTEXIS LTX MICRO  7.5

## (undated) DEVICE — APPLIER CLIP EPIX UNIV 5X34

## (undated) DEVICE — TUBING LAPARSCOPIC INSUFFLATIN

## (undated) DEVICE — PACK GENERAL SURGERY

## (undated) DEVICE — SEE MEDLINE ITEM 146296

## (undated) DEVICE — TROCAR ENDO Z THREAD KII 5X100

## (undated) DEVICE — DISSECTOR EPIX LAPA 5MMX35CM

## (undated) DEVICE — NDL ECLIPSE SAFETY 18GX1-1/2IN

## (undated) DEVICE — SCISSOR 5MMX35CM DIRECT DRIVE

## (undated) DEVICE — NDL SAFETY 25G X 1.5 ECLIPSE

## (undated) DEVICE — IRRIGATOR ENDOSCOPY DISP.

## (undated) DEVICE — APPLICATOR CHLORAPREP ORN 26ML

## (undated) DEVICE — SYR W/CANNULA 10CC

## (undated) DEVICE — ADHESIVE DERMABOND ADVANCED

## (undated) DEVICE — GLOVE 8 PROTEXIS PI BLUE

## (undated) DEVICE — CANNULA LAP SEAL Z THRD 5X100